# Patient Record
Sex: MALE | Race: WHITE | Employment: UNEMPLOYED | URBAN - METROPOLITAN AREA
[De-identification: names, ages, dates, MRNs, and addresses within clinical notes are randomized per-mention and may not be internally consistent; named-entity substitution may affect disease eponyms.]

---

## 2017-01-12 RX ORDER — HYDROCHLOROTHIAZIDE 25 MG/1
TABLET ORAL
Qty: 90 TABLET | Refills: 0 | Status: SHIPPED | OUTPATIENT
Start: 2017-01-12 | End: 2017-04-09 | Stop reason: SDUPTHER

## 2017-01-12 RX ORDER — FLUOXETINE HYDROCHLORIDE 20 MG/1
CAPSULE ORAL
Qty: 90 CAPSULE | Refills: 0 | Status: SHIPPED | OUTPATIENT
Start: 2017-01-12 | End: 2017-04-09 | Stop reason: SDUPTHER

## 2017-04-10 RX ORDER — HYDROCHLOROTHIAZIDE 25 MG/1
TABLET ORAL
Qty: 90 TABLET | Refills: 0 | Status: SHIPPED | OUTPATIENT
Start: 2017-04-10 | End: 2018-04-24 | Stop reason: SDUPTHER

## 2017-04-10 RX ORDER — FLUOXETINE HYDROCHLORIDE 20 MG/1
CAPSULE ORAL
Qty: 90 CAPSULE | Refills: 0 | Status: SHIPPED | OUTPATIENT
Start: 2017-04-10 | End: 2018-04-24 | Stop reason: SDUPTHER

## 2018-04-24 ENCOUNTER — OFFICE VISIT (OUTPATIENT)
Dept: PRIMARY CARE CLINIC | Age: 53
End: 2018-04-24

## 2018-04-24 VITALS
HEIGHT: 65 IN | WEIGHT: 185 LBS | DIASTOLIC BLOOD PRESSURE: 92 MMHG | BODY MASS INDEX: 30.82 KG/M2 | SYSTOLIC BLOOD PRESSURE: 150 MMHG

## 2018-04-24 DIAGNOSIS — Z12.11 COLON CANCER SCREENING: ICD-10-CM

## 2018-04-24 DIAGNOSIS — I10 BENIGN ESSENTIAL HTN: ICD-10-CM

## 2018-04-24 DIAGNOSIS — Z00.00 ROUTINE PHYSICAL EXAMINATION: Primary | ICD-10-CM

## 2018-04-24 LAB
A/G RATIO: 1.4 (ref 1.1–2.2)
ALBUMIN SERPL-MCNC: 4.2 G/DL (ref 3.4–5)
ALP BLD-CCNC: 81 U/L (ref 40–129)
ALT SERPL-CCNC: 13 U/L (ref 10–40)
ANION GAP SERPL CALCULATED.3IONS-SCNC: 11 MMOL/L (ref 3–16)
AST SERPL-CCNC: 13 U/L (ref 15–37)
BILIRUB SERPL-MCNC: 0.7 MG/DL (ref 0–1)
BUN BLDV-MCNC: 12 MG/DL (ref 7–20)
CALCIUM SERPL-MCNC: 9 MG/DL (ref 8.3–10.6)
CHLORIDE BLD-SCNC: 104 MMOL/L (ref 99–110)
CHOLESTEROL, TOTAL: 160 MG/DL (ref 0–199)
CO2: 27 MMOL/L (ref 21–32)
CREAT SERPL-MCNC: 1.2 MG/DL (ref 0.9–1.3)
CREATININE URINE: 151.4 MG/DL (ref 39–259)
GFR AFRICAN AMERICAN: >60
GFR NON-AFRICAN AMERICAN: >60
GLOBULIN: 2.9 G/DL
GLUCOSE BLD-MCNC: 101 MG/DL (ref 70–99)
HDLC SERPL-MCNC: 40 MG/DL (ref 40–60)
LDL CHOLESTEROL CALCULATED: 102 MG/DL
MICROALBUMIN UR-MCNC: <1.2 MG/DL
MICROALBUMIN/CREAT UR-RTO: NORMAL MG/G (ref 0–30)
POTASSIUM SERPL-SCNC: 4.1 MMOL/L (ref 3.5–5.1)
SODIUM BLD-SCNC: 142 MMOL/L (ref 136–145)
TOTAL PROTEIN: 7.1 G/DL (ref 6.4–8.2)
TRIGL SERPL-MCNC: 88 MG/DL (ref 0–150)
VLDLC SERPL CALC-MCNC: 18 MG/DL

## 2018-04-24 PROCEDURE — 99396 PREV VISIT EST AGE 40-64: CPT | Performed by: INTERNAL MEDICINE

## 2018-04-24 RX ORDER — FLUOXETINE HYDROCHLORIDE 20 MG/1
CAPSULE ORAL
Qty: 30 CAPSULE | Refills: 11 | Status: SHIPPED | OUTPATIENT
Start: 2018-04-24 | End: 2018-07-26 | Stop reason: SDUPTHER

## 2018-04-24 RX ORDER — HYDROCHLOROTHIAZIDE 25 MG/1
TABLET ORAL
Qty: 30 TABLET | Refills: 1 | Status: SHIPPED | OUTPATIENT
Start: 2018-04-24 | End: 2018-06-24 | Stop reason: SDUPTHER

## 2018-04-24 RX ORDER — OMEPRAZOLE 40 MG/1
40 CAPSULE, DELAYED RELEASE ORAL DAILY
Qty: 30 CAPSULE | Refills: 11 | Status: SHIPPED | OUTPATIENT
Start: 2018-04-24 | End: 2018-07-26 | Stop reason: SDUPTHER

## 2018-04-25 PROBLEM — R73.03 PREDIABETES: Status: ACTIVE | Noted: 2018-04-25

## 2018-04-25 LAB
ESTIMATED AVERAGE GLUCOSE: 122.6 MG/DL
HBA1C MFR BLD: 5.9 %
PROSTATE SPECIFIC ANTIGEN: 1.88 NG/ML (ref 0–4)

## 2018-05-07 ENCOUNTER — TELEPHONE (OUTPATIENT)
Dept: PRIMARY CARE CLINIC | Age: 53
End: 2018-05-07

## 2018-06-25 RX ORDER — HYDROCHLOROTHIAZIDE 25 MG/1
TABLET ORAL
Qty: 30 TABLET | Refills: 5 | Status: SHIPPED | OUTPATIENT
Start: 2018-06-25 | End: 2018-07-26 | Stop reason: SDUPTHER

## 2018-07-26 RX ORDER — FLUOXETINE HYDROCHLORIDE 20 MG/1
CAPSULE ORAL
Qty: 30 CAPSULE | Refills: 11 | Status: SHIPPED | OUTPATIENT
Start: 2018-07-26 | End: 2021-01-28 | Stop reason: SDUPTHER

## 2018-07-26 RX ORDER — FLUTICASONE PROPIONATE 50 MCG
SPRAY, SUSPENSION (ML) NASAL
Qty: 1 BOTTLE | Refills: 5 | Status: SHIPPED | OUTPATIENT
Start: 2018-07-26

## 2018-07-26 RX ORDER — HYDROCHLOROTHIAZIDE 25 MG/1
TABLET ORAL
Qty: 30 TABLET | Refills: 5 | Status: SHIPPED | OUTPATIENT
Start: 2018-07-26 | End: 2021-01-28 | Stop reason: SDUPTHER

## 2018-07-26 RX ORDER — OMEPRAZOLE 40 MG/1
40 CAPSULE, DELAYED RELEASE ORAL DAILY
Qty: 30 CAPSULE | Refills: 11 | Status: SHIPPED | OUTPATIENT
Start: 2018-07-26 | End: 2021-02-02 | Stop reason: SDUPTHER

## 2020-12-24 ENCOUNTER — APPOINTMENT (OUTPATIENT)
Dept: CT IMAGING | Age: 55
DRG: 123 | End: 2020-12-24

## 2020-12-24 ENCOUNTER — HOSPITAL ENCOUNTER (INPATIENT)
Age: 55
LOS: 1 days | Discharge: HOME | DRG: 123 | End: 2020-12-25
Attending: EMERGENCY MEDICINE | Admitting: HOSPITALIST

## 2020-12-24 PROBLEM — I16.0 HYPERTENSIVE URGENCY, MALIGNANT: Status: ACTIVE | Noted: 2020-12-24

## 2020-12-24 PROBLEM — I16.1 HYPERTENSIVE EMERGENCY: Status: ACTIVE | Noted: 2020-12-24

## 2020-12-24 PROBLEM — H53.132 SUDDEN LOSS OF VISION, LEFT: Status: ACTIVE | Noted: 2020-12-24

## 2020-12-24 PROBLEM — K21.9 GERD (GASTROESOPHAGEAL REFLUX DISEASE): Status: ACTIVE | Noted: 2020-12-24

## 2020-12-24 PROBLEM — H53.132: Status: ACTIVE | Noted: 2020-12-24

## 2020-12-24 LAB
A/G RATIO: 1.2 (ref 1.1–2.2)
ALBUMIN SERPL-MCNC: 4 G/DL (ref 3.4–5)
ALP BLD-CCNC: 74 U/L (ref 40–129)
ALT SERPL-CCNC: 19 U/L (ref 10–40)
ANION GAP SERPL CALCULATED.3IONS-SCNC: 8 MMOL/L (ref 3–16)
AST SERPL-CCNC: 18 U/L (ref 15–37)
BASOPHILS ABSOLUTE: 0 K/UL (ref 0–0.2)
BASOPHILS RELATIVE PERCENT: 0.6 %
BILIRUB SERPL-MCNC: 0.9 MG/DL (ref 0–1)
BUN BLDV-MCNC: 15 MG/DL (ref 7–20)
CALCIUM SERPL-MCNC: 9 MG/DL (ref 8.3–10.6)
CHLORIDE BLD-SCNC: 105 MMOL/L (ref 99–110)
CO2: 25 MMOL/L (ref 21–32)
CREAT SERPL-MCNC: 1.4 MG/DL (ref 0.9–1.3)
EKG ATRIAL RATE: 60 BPM
EKG DIAGNOSIS: NORMAL
EKG P AXIS: 47 DEGREES
EKG P-R INTERVAL: 154 MS
EKG Q-T INTERVAL: 442 MS
EKG QRS DURATION: 130 MS
EKG QTC CALCULATION (BAZETT): 442 MS
EKG R AXIS: 12 DEGREES
EKG T AXIS: 14 DEGREES
EKG VENTRICULAR RATE: 60 BPM
EOSINOPHILS ABSOLUTE: 0.2 K/UL (ref 0–0.6)
EOSINOPHILS RELATIVE PERCENT: 3.2 %
ETHANOL: NORMAL MG/DL (ref 0–0.08)
GFR AFRICAN AMERICAN: >60
GFR NON-AFRICAN AMERICAN: 53
GLOBULIN: 3.4 G/DL
GLUCOSE BLD-MCNC: 106 MG/DL (ref 70–99)
HCT VFR BLD CALC: 40.9 % (ref 40.5–52.5)
HEMOGLOBIN: 13.4 G/DL (ref 13.5–17.5)
INR BLD: 1.03 (ref 0.86–1.14)
LYMPHOCYTES ABSOLUTE: 1 K/UL (ref 1–5.1)
LYMPHOCYTES RELATIVE PERCENT: 15.1 %
MCH RBC QN AUTO: 26 PG (ref 26–34)
MCHC RBC AUTO-ENTMCNC: 32.9 G/DL (ref 31–36)
MCV RBC AUTO: 79 FL (ref 80–100)
MONOCYTES ABSOLUTE: 0.6 K/UL (ref 0–1.3)
MONOCYTES RELATIVE PERCENT: 9 %
NEUTROPHILS ABSOLUTE: 4.6 K/UL (ref 1.7–7.7)
NEUTROPHILS RELATIVE PERCENT: 72.1 %
PDW BLD-RTO: 14.5 % (ref 12.4–15.4)
PLATELET # BLD: 242 K/UL (ref 135–450)
PMV BLD AUTO: 7.6 FL (ref 5–10.5)
POTASSIUM REFLEX MAGNESIUM: 4 MMOL/L (ref 3.5–5.1)
PRO-BNP: 51 PG/ML (ref 0–124)
PROTHROMBIN TIME: 11.9 SEC (ref 10–13.2)
RBC # BLD: 5.18 M/UL (ref 4.2–5.9)
SODIUM BLD-SCNC: 138 MMOL/L (ref 136–145)
TOTAL PROTEIN: 7.4 G/DL (ref 6.4–8.2)
TROPONIN: <0.01 NG/ML
WBC # BLD: 6.4 K/UL (ref 4–11)

## 2020-12-24 PROCEDURE — 80053 COMPREHEN METABOLIC PANEL: CPT

## 2020-12-24 PROCEDURE — 1200000000 HC SEMI PRIVATE

## 2020-12-24 PROCEDURE — 2500000003 HC RX 250 WO HCPCS: Performed by: EMERGENCY MEDICINE

## 2020-12-24 PROCEDURE — 70496 CT ANGIOGRAPHY HEAD: CPT

## 2020-12-24 PROCEDURE — 85610 PROTHROMBIN TIME: CPT

## 2020-12-24 PROCEDURE — 96374 THER/PROPH/DIAG INJ IV PUSH: CPT

## 2020-12-24 PROCEDURE — 93005 ELECTROCARDIOGRAM TRACING: CPT | Performed by: EMERGENCY MEDICINE

## 2020-12-24 PROCEDURE — 93010 ELECTROCARDIOGRAM REPORT: CPT | Performed by: INTERNAL MEDICINE

## 2020-12-24 PROCEDURE — 2580000003 HC RX 258: Performed by: HOSPITALIST

## 2020-12-24 PROCEDURE — 6370000000 HC RX 637 (ALT 250 FOR IP): Performed by: EMERGENCY MEDICINE

## 2020-12-24 PROCEDURE — G0480 DRUG TEST DEF 1-7 CLASSES: HCPCS

## 2020-12-24 PROCEDURE — 6370000000 HC RX 637 (ALT 250 FOR IP): Performed by: HOSPITALIST

## 2020-12-24 PROCEDURE — 84484 ASSAY OF TROPONIN QUANT: CPT

## 2020-12-24 PROCEDURE — 83880 ASSAY OF NATRIURETIC PEPTIDE: CPT

## 2020-12-24 PROCEDURE — 70450 CT HEAD/BRAIN W/O DYE: CPT

## 2020-12-24 PROCEDURE — 36415 COLL VENOUS BLD VENIPUNCTURE: CPT

## 2020-12-24 PROCEDURE — 85025 COMPLETE CBC W/AUTO DIFF WBC: CPT

## 2020-12-24 PROCEDURE — 6360000004 HC RX CONTRAST MEDICATION: Performed by: EMERGENCY MEDICINE

## 2020-12-24 PROCEDURE — 99285 EMERGENCY DEPT VISIT HI MDM: CPT

## 2020-12-24 RX ORDER — ONDANSETRON 2 MG/ML
4 INJECTION INTRAMUSCULAR; INTRAVENOUS EVERY 6 HOURS PRN
Status: DISCONTINUED | OUTPATIENT
Start: 2020-12-24 | End: 2020-12-25 | Stop reason: HOSPADM

## 2020-12-24 RX ORDER — SODIUM CHLORIDE 0.9 % (FLUSH) 0.9 %
10 SYRINGE (ML) INJECTION PRN
Status: DISCONTINUED | OUTPATIENT
Start: 2020-12-24 | End: 2020-12-25 | Stop reason: HOSPADM

## 2020-12-24 RX ORDER — PANTOPRAZOLE SODIUM 40 MG/1
40 TABLET, DELAYED RELEASE ORAL
Status: DISCONTINUED | OUTPATIENT
Start: 2020-12-25 | End: 2020-12-25 | Stop reason: HOSPADM

## 2020-12-24 RX ORDER — ASPIRIN 81 MG/1
81 TABLET ORAL DAILY
Status: DISCONTINUED | OUTPATIENT
Start: 2020-12-25 | End: 2020-12-25 | Stop reason: HOSPADM

## 2020-12-24 RX ORDER — SODIUM CHLORIDE 0.9 % (FLUSH) 0.9 %
10 SYRINGE (ML) INJECTION EVERY 12 HOURS SCHEDULED
Status: DISCONTINUED | OUTPATIENT
Start: 2020-12-24 | End: 2020-12-25 | Stop reason: HOSPADM

## 2020-12-24 RX ORDER — ASPIRIN 300 MG/1
300 SUPPOSITORY RECTAL DAILY
Status: DISCONTINUED | OUTPATIENT
Start: 2020-12-25 | End: 2020-12-25 | Stop reason: HOSPADM

## 2020-12-24 RX ORDER — SENNA PLUS 8.6 MG/1
1 TABLET ORAL DAILY PRN
Status: DISCONTINUED | OUTPATIENT
Start: 2020-12-24 | End: 2020-12-25 | Stop reason: HOSPADM

## 2020-12-24 RX ORDER — ASPIRIN 81 MG/1
324 TABLET, CHEWABLE ORAL ONCE
Status: COMPLETED | OUTPATIENT
Start: 2020-12-24 | End: 2020-12-24

## 2020-12-24 RX ORDER — ATORVASTATIN CALCIUM 80 MG/1
80 TABLET, FILM COATED ORAL NIGHTLY
Status: DISCONTINUED | OUTPATIENT
Start: 2020-12-24 | End: 2020-12-25 | Stop reason: HOSPADM

## 2020-12-24 RX ORDER — HYDRALAZINE HYDROCHLORIDE 25 MG/1
50 TABLET, FILM COATED ORAL EVERY 8 HOURS SCHEDULED
Status: DISCONTINUED | OUTPATIENT
Start: 2020-12-24 | End: 2020-12-24

## 2020-12-24 RX ORDER — HYDRALAZINE HYDROCHLORIDE 25 MG/1
50 TABLET, FILM COATED ORAL EVERY 8 HOURS SCHEDULED
Status: DISCONTINUED | OUTPATIENT
Start: 2020-12-24 | End: 2020-12-25 | Stop reason: HOSPADM

## 2020-12-24 RX ORDER — FLUOXETINE HYDROCHLORIDE 20 MG/1
20 CAPSULE ORAL DAILY
Status: DISCONTINUED | OUTPATIENT
Start: 2020-12-24 | End: 2020-12-25 | Stop reason: HOSPADM

## 2020-12-24 RX ORDER — ONDANSETRON 4 MG/1
4 TABLET, ORALLY DISINTEGRATING ORAL EVERY 8 HOURS PRN
Status: DISCONTINUED | OUTPATIENT
Start: 2020-12-24 | End: 2020-12-25 | Stop reason: HOSPADM

## 2020-12-24 RX ORDER — LABETALOL HYDROCHLORIDE 5 MG/ML
10 INJECTION, SOLUTION INTRAVENOUS EVERY 10 MIN PRN
Status: DISCONTINUED | OUTPATIENT
Start: 2020-12-24 | End: 2020-12-25 | Stop reason: HOSPADM

## 2020-12-24 RX ADMIN — FLUOXETINE 20 MG: 20 CAPSULE ORAL at 21:48

## 2020-12-24 RX ADMIN — ASPIRIN 324 MG: 81 TABLET, CHEWABLE ORAL at 11:45

## 2020-12-24 RX ADMIN — Medication 10 ML: at 21:50

## 2020-12-24 RX ADMIN — HYDRALAZINE HYDROCHLORIDE 50 MG: 25 TABLET, FILM COATED ORAL at 16:02

## 2020-12-24 RX ADMIN — ATORVASTATIN CALCIUM 80 MG: 80 TABLET, FILM COATED ORAL at 21:48

## 2020-12-24 RX ADMIN — IOPAMIDOL 75 ML: 755 INJECTION, SOLUTION INTRAVENOUS at 09:59

## 2020-12-24 RX ADMIN — HYDRALAZINE HYDROCHLORIDE 50 MG: 25 TABLET, FILM COATED ORAL at 21:48

## 2020-12-24 RX ADMIN — NICARDIPINE HYDROCHLORIDE 3 MG/HR: 0.1 INJECTION, SOLUTION INTRAVENOUS at 11:51

## 2020-12-24 ASSESSMENT — PAIN - FUNCTIONAL ASSESSMENT: PAIN_FUNCTIONAL_ASSESSMENT: 0-10

## 2020-12-24 NOTE — H&P
HOSPITALISTS HISTORY AND PHYSICAL    12/24/2020 12:36 PM    Patient Information:  Claudette Hdz is a 48 y.o. male [id number]  PCP:  No primary care provider on file. (Tel: None )    Chief complaint:    Chief Complaint   Patient presents with    Eye Problem     0830 complete loss of vision in left eye. Now able to see bright colors. No numbness, tingling, weakness, balance issues. History of Present Illness:  Dallas Sy is a 48 y.o. male who presented to the ED to be evaluated for sudden loss of vision in his left eye beginning at 8:30 AM this morning. He denies injury, headache, photophobia, globe discomfort, and eye drainage prior to initiation of symptoms. Patient has no other neurologic symptoms including dysarthria, diplopia, dizziness, or hemiplegia. Upon arrival to the ED vital signs were obtained and notable for profound BP elevation of 209/123. The patient endorses a history of hypertension for which she had been prescribed HydroDIURIL but admits that he has not seen a PCP or taking any BP medications for greater than 1 year. Stat head CT was obtained and notable for bifrontal atrophy as well as small vessel ischemic disease greater than expected for his age. CTA revealed 70% stenosis of left PCA/40% stenosis of right PCA/50% stenosis of ICA. He will require admission to further treat and evaluate hypertensive emergency associated with transient loss of vision. History obtained from patient and review of [facility] chart  Old medical records show patient has a past history of HTN, HLD, GERD, and prediabetes      REVIEW OF SYSTEMS:   Constitutional: Negative for fever,chills or night sweats  Eyes: Positive loss of vision left eye; no pain in globe; no discharge  ENT: Negative for rhinorrhea, epistaxis, hoarseness, sore throat.   Respiratory: Negative for shortness of breath,wheezing  Cardiovascular: Negative for chest pain, palpitations   Gastrointestinal: Negative for nausea, vomiting, diarrhea  Genitourinary: Negative for polyuria, dysuria   Hematologic/Lymphatic: Negative for bleeding tendency, easy bruising  Musculoskeletal: Negative for myalgias and arthralgias  Neurologic: Negative for confusion,dysarthria; negative headache  Skin: Negative for itching,rash  Psychiatric: Positive for depression,anxiety  Endocrine: Negative for polydipsia,polyuria,heat /cold intolerance. Past Medical History:   has a past medical history of Allergic rhinitis, Heartburn, Hypertension, and Prediabetes. Past Surgical History:   has no past surgical history on file. Medications:  No current facility-administered medications on file prior to encounter. Current Outpatient Medications on File Prior to Encounter   Medication Sig Dispense Refill    FLUoxetine (PROZAC) 20 MG capsule TAKE ONE CAPSULE BY MOUTH DAILY 30 capsule 11    fluticasone (FLONASE) 50 MCG/ACT nasal spray PLACE TWO SPRAYS IN EACH NOSTRIL ONCE DAILY 1 Bottle 5    hydrochlorothiazide (HYDRODIURIL) 25 MG tablet TAKE ONE TABLET BY MOUTH EVERY MORNING FOR ELEVATED BLOOD PRESSURE 30 tablet 5    omeprazole (PRILOSEC) 40 MG delayed release capsule Take 1 capsule by mouth daily 30 capsule 11       Allergies:  No Known Allergies     Social History:  Patient Lives  reports that he has never smoked. He has never used smokeless tobacco. He reports that he does not drink alcohol or use drugs. Family History:  family history includes Hypertension in his father. Physical Exam:  BP (!) 147/97   Pulse 57   Temp 98 °F (36.7 °C) (Oral)   Resp 16   Ht 5' 6\" (1.676 m)   Wt 206 lb 4.8 oz (93.6 kg)   SpO2 97%   BMI 33.30 kg/m²     General appearance:  Appears comfortable. Well nourished  Eyes: PERRLA; EOMI bilaterally; no conjunctival injection, no ptosis  ENT: Moist mucus membranes, no thrush.  Trachea Rate 60 BPM QTc Calculation (Bazett) 442 ms   Atrial Rate 60 BPM P Axis 47 degrees   P-R Interval 154 ms R Axis 12 degrees   QRS Duration 130 ms T Axis 14 degrees   Q-T Interval 442 ms Diagnosis Normal sinus rhythmRight bundle branch blockAbnormal ECGConfirmed by Mac Saab MD, Ashleigh Hughes ([id number]) . .. Discussed case  with ED provider    Problem List  Principal Problem:    Vision, loss, sudden, left  Active Problems:    Hypertensive urgency, malignant    Sleep apnea    GERD (gastroesophageal reflux disease)    Sudden loss of vision, left  Resolved Problems:    * No resolved hospital problems. *        Assessment/Plan:     Transient vision loss left eye  -Symptoms resolved  -Neurology consultation placed; question need for ophthalmology with dilated eye exam  -Patient admitted under CVA pathway with every 4 hours neuro checks  -Initiate statin therapy  -Initiate ASA 81 mg daily    Malignant hypertensive urgency  -Suspect responsible for transient visual loss  -Patient currently on Cardene GTT which was initiated in ED  -Begin oral hydralazine 50 mg every 8 H with plans to wean off Cardene  -Echo scheduled for a.m.  -Telemetry monitoring during stay  -Consultation placed to cardiology    GERD-PPI ordered    History of prediabetes-A1c pending; patient euglycemic upon admission      DVT prophylaxis-BLE SCD; no chemical anticoagulation due to concerns for ICH due to malignant HTN  Code status-full code  Diet-cardiac CHARY  IV access-PIV established in ED      Admit as inpatient. I anticipate hospitalization spanning more than two midnights for investigation and treatment of the above medically necessary diagnoses. Please note that some part of this chart was generated using Dragon dictation software. Although every effort was made to ensure the accuracy of this automated transcription, some errors in transcription may have occurred inadvertently.  If you may need any clarification, please do not hesitate to contact me through EPIC.        Yovany Casas MD    12/24/2020 12:36 PM

## 2020-12-24 NOTE — ED NOTES
ED Dr. Emani Cronin assess & ordered  stroke team @ 0940  CT notified of ED CODE STROKE 0940  PAGED \"ED CODE STROKE\" overhead  Lab notified of ED CODE Donna Mcleod stroke Dr  returned call [id number]  12/24/20 21

## 2020-12-24 NOTE — ED NOTES
Pt reports vision has returned to \"Almost normal, just a little cloudy\".      Zamzam Hess RN  12/24/20 1024

## 2020-12-24 NOTE — Clinical Note
Patient Class: Inpatient [101]   REQUIRED: Diagnosis: Sudden loss of vision, left [354756]   Estimated Length of Stay: Estimated stay of more than 2 midnights   Admitting Provider: Keli Guzman [[id number]]   Preferred Department: Currently on Cardene GTT with titration   Telemetry Bed Required?: Yes

## 2020-12-25 ENCOUNTER — HOSPITAL ENCOUNTER (OUTPATIENT)
Age: 55
Setting detail: OBSERVATION
Discharge: HOME | End: 2020-12-26
Attending: EMERGENCY MEDICINE | Admitting: INTERNAL MEDICINE

## 2020-12-25 VITALS
SYSTOLIC BLOOD PRESSURE: 128 MMHG | DIASTOLIC BLOOD PRESSURE: 83 MMHG | WEIGHT: 206.3 LBS | OXYGEN SATURATION: 97 % | BODY MASS INDEX: 33.16 KG/M2 | HEART RATE: 86 BPM | HEIGHT: 66 IN | RESPIRATION RATE: 16 BRPM | TEMPERATURE: 97.9 F

## 2020-12-25 PROBLEM — H53.122: Status: ACTIVE | Noted: 2020-12-25

## 2020-12-25 PROBLEM — H54.7 VISION LOSS: Status: ACTIVE | Noted: 2020-12-25

## 2020-12-25 LAB
CHOLESTEROL, TOTAL: 152 MG/DL (ref 0–199)
HCT VFR BLD CALC: 43.1 % (ref 40.5–52.5)
HDLC SERPL-MCNC: 35 MG/DL (ref 40–60)
HEMOGLOBIN: 14.5 G/DL (ref 13.5–17.5)
LDL CHOLESTEROL CALCULATED: 102 MG/DL
MCH RBC QN AUTO: 26.1 PG (ref 26–34)
MCHC RBC AUTO-ENTMCNC: 33.6 G/DL (ref 31–36)
MCV RBC AUTO: 77.8 FL (ref 80–100)
PDW BLD-RTO: 14.8 % (ref 12.4–15.4)
PLATELET # BLD: 310 K/UL (ref 135–450)
PMV BLD AUTO: 8.2 FL (ref 5–10.5)
RBC # BLD: 5.54 M/UL (ref 4.2–5.9)
TRIGL SERPL-MCNC: 73 MG/DL (ref 0–150)
VLDLC SERPL CALC-MCNC: 15 MG/DL
WBC # BLD: 10.6 K/UL (ref 4–11)

## 2020-12-25 PROCEDURE — 2580000003 HC RX 258: Performed by: HOSPITALIST

## 2020-12-25 PROCEDURE — 80061 LIPID PANEL: CPT

## 2020-12-25 PROCEDURE — 36415 COLL VENOUS BLD VENIPUNCTURE: CPT

## 2020-12-25 PROCEDURE — 97530 THERAPEUTIC ACTIVITIES: CPT

## 2020-12-25 PROCEDURE — 2580000003 HC RX 258: Performed by: INTERNAL MEDICINE

## 2020-12-25 PROCEDURE — G0378 HOSPITAL OBSERVATION PER HR: HCPCS

## 2020-12-25 PROCEDURE — 97535 SELF CARE MNGMENT TRAINING: CPT

## 2020-12-25 PROCEDURE — 85027 COMPLETE CBC AUTOMATED: CPT

## 2020-12-25 PROCEDURE — 92526 ORAL FUNCTION THERAPY: CPT

## 2020-12-25 PROCEDURE — 83036 HEMOGLOBIN GLYCOSYLATED A1C: CPT

## 2020-12-25 PROCEDURE — 6370000000 HC RX 637 (ALT 250 FOR IP): Performed by: INTERNAL MEDICINE

## 2020-12-25 PROCEDURE — 97165 OT EVAL LOW COMPLEX 30 MIN: CPT

## 2020-12-25 PROCEDURE — 99284 EMERGENCY DEPT VISIT MOD MDM: CPT

## 2020-12-25 PROCEDURE — 99254 IP/OBS CNSLTJ NEW/EST MOD 60: CPT | Performed by: INTERNAL MEDICINE

## 2020-12-25 PROCEDURE — 6370000000 HC RX 637 (ALT 250 FOR IP): Performed by: HOSPITALIST

## 2020-12-25 PROCEDURE — 97161 PT EVAL LOW COMPLEX 20 MIN: CPT

## 2020-12-25 PROCEDURE — 92610 EVALUATE SWALLOWING FUNCTION: CPT

## 2020-12-25 RX ORDER — FLUTICASONE PROPIONATE 50 MCG
1 SPRAY, SUSPENSION (ML) NASAL DAILY
Status: DISCONTINUED | OUTPATIENT
Start: 2020-12-26 | End: 2020-12-26 | Stop reason: HOSPADM

## 2020-12-25 RX ORDER — ACETAMINOPHEN 650 MG/1
650 SUPPOSITORY RECTAL EVERY 6 HOURS PRN
Status: DISCONTINUED | OUTPATIENT
Start: 2020-12-25 | End: 2020-12-26 | Stop reason: HOSPADM

## 2020-12-25 RX ORDER — PANTOPRAZOLE SODIUM 40 MG/1
40 TABLET, DELAYED RELEASE ORAL
Status: DISCONTINUED | OUTPATIENT
Start: 2020-12-26 | End: 2020-12-26 | Stop reason: HOSPADM

## 2020-12-25 RX ORDER — AMLODIPINE BESYLATE 5 MG/1
10 TABLET ORAL DAILY
Status: DISCONTINUED | OUTPATIENT
Start: 2020-12-26 | End: 2020-12-26 | Stop reason: HOSPADM

## 2020-12-25 RX ORDER — ONDANSETRON 2 MG/ML
4 INJECTION INTRAMUSCULAR; INTRAVENOUS EVERY 6 HOURS PRN
Status: DISCONTINUED | OUTPATIENT
Start: 2020-12-25 | End: 2020-12-26 | Stop reason: HOSPADM

## 2020-12-25 RX ORDER — PROMETHAZINE HYDROCHLORIDE 25 MG/1
12.5 TABLET ORAL EVERY 6 HOURS PRN
Status: DISCONTINUED | OUTPATIENT
Start: 2020-12-25 | End: 2020-12-26 | Stop reason: HOSPADM

## 2020-12-25 RX ORDER — AMLODIPINE BESYLATE 5 MG/1
10 TABLET ORAL DAILY
Qty: 60 TABLET | Refills: 0 | Status: SHIPPED | OUTPATIENT
Start: 2020-12-25 | End: 2021-01-28 | Stop reason: SDUPTHER

## 2020-12-25 RX ORDER — AMLODIPINE BESYLATE 5 MG/1
5 TABLET ORAL DAILY
Status: DISCONTINUED | OUTPATIENT
Start: 2020-12-25 | End: 2020-12-25 | Stop reason: HOSPADM

## 2020-12-25 RX ORDER — ATORVASTATIN CALCIUM 40 MG/1
40 TABLET, FILM COATED ORAL NIGHTLY
Status: DISCONTINUED | OUTPATIENT
Start: 2020-12-25 | End: 2020-12-26 | Stop reason: HOSPADM

## 2020-12-25 RX ORDER — ASPIRIN 81 MG/1
81 TABLET ORAL DAILY
Status: DISCONTINUED | OUTPATIENT
Start: 2020-12-26 | End: 2020-12-26 | Stop reason: HOSPADM

## 2020-12-25 RX ORDER — FLUOXETINE HYDROCHLORIDE 20 MG/1
20 CAPSULE ORAL DAILY
Status: DISCONTINUED | OUTPATIENT
Start: 2020-12-26 | End: 2020-12-26 | Stop reason: HOSPADM

## 2020-12-25 RX ORDER — ACETAMINOPHEN 325 MG/1
650 TABLET ORAL EVERY 6 HOURS PRN
Status: DISCONTINUED | OUTPATIENT
Start: 2020-12-25 | End: 2020-12-26 | Stop reason: HOSPADM

## 2020-12-25 RX ORDER — SODIUM CHLORIDE 0.9 % (FLUSH) 0.9 %
10 SYRINGE (ML) INJECTION PRN
Status: DISCONTINUED | OUTPATIENT
Start: 2020-12-25 | End: 2020-12-26 | Stop reason: HOSPADM

## 2020-12-25 RX ORDER — ASPIRIN 81 MG/1
81 TABLET ORAL DAILY
Qty: 30 TABLET | Refills: 0 | Status: SHIPPED | OUTPATIENT
Start: 2020-12-26 | End: 2022-10-11

## 2020-12-25 RX ORDER — SODIUM CHLORIDE 0.9 % (FLUSH) 0.9 %
10 SYRINGE (ML) INJECTION EVERY 12 HOURS SCHEDULED
Status: DISCONTINUED | OUTPATIENT
Start: 2020-12-25 | End: 2020-12-26 | Stop reason: HOSPADM

## 2020-12-25 RX ORDER — ATORVASTATIN CALCIUM 40 MG/1
40 TABLET, FILM COATED ORAL NIGHTLY
Qty: 30 TABLET | Refills: 0 | Status: SHIPPED | OUTPATIENT
Start: 2020-12-25 | End: 2021-01-28 | Stop reason: SDUPTHER

## 2020-12-25 RX ORDER — HYDROCHLOROTHIAZIDE 25 MG/1
25 TABLET ORAL DAILY
Status: DISCONTINUED | OUTPATIENT
Start: 2020-12-26 | End: 2020-12-26 | Stop reason: HOSPADM

## 2020-12-25 RX ORDER — POLYETHYLENE GLYCOL 3350 17 G/17G
17 POWDER, FOR SOLUTION ORAL DAILY PRN
Status: DISCONTINUED | OUTPATIENT
Start: 2020-12-25 | End: 2020-12-26 | Stop reason: HOSPADM

## 2020-12-25 RX ADMIN — Medication 10 ML: at 22:35

## 2020-12-25 RX ADMIN — FLUOXETINE 20 MG: 20 CAPSULE ORAL at 08:11

## 2020-12-25 RX ADMIN — ATORVASTATIN CALCIUM 40 MG: 40 TABLET, FILM COATED ORAL at 22:27

## 2020-12-25 RX ADMIN — Medication 10 ML: at 08:11

## 2020-12-25 RX ADMIN — HYDRALAZINE HYDROCHLORIDE 50 MG: 25 TABLET, FILM COATED ORAL at 05:34

## 2020-12-25 RX ADMIN — AMLODIPINE BESYLATE 5 MG: 5 TABLET ORAL at 12:11

## 2020-12-25 RX ADMIN — ASPIRIN 81 MG: 81 TABLET, COATED ORAL at 08:10

## 2020-12-25 RX ADMIN — PANTOPRAZOLE SODIUM 40 MG: 40 TABLET, DELAYED RELEASE ORAL at 05:34

## 2020-12-25 ASSESSMENT — PAIN SCALES - GENERAL: PAINLEVEL_OUTOF10: 0

## 2020-12-25 NOTE — ED PROVIDER NOTES
Emergency Physician Note    Chief Complaint  Eye Problem (0830 complete loss of vision in left eye. Now able to see bright colors. No numbness, tingling, weakness, balance issues.)       History of Present Illness  Krista Alvarez is a 48 y.o. male who presents to the ED for vision loss. Patient reports vision loss in the left eye. He states that around 830 this morning when he woke up he noticed that it was completely dark with only a little bit of peripheral vision far lateral that was present in the left eye. He states that when he went to the bathroom in the middle the night he did not turn on the lights and is not sure if it was present at that time as well. He states it is improved somewhat since initial onset and now feels like there is a web or release in front of his thigh but is still diminished. He denies any other symptoms. He states he has hypertension but has not been treated for a few years because he does not have insurance. He denies any other acute complaints. No numbness or weakness. No trouble with his speech or balance. 10 systems reviewed, pertinent positives per HPI otherwise noted to be negative    I have reviewed the following from the nursing documentation:      Prior to Admission medications    Medication Sig Start Date End Date Taking?  Authorizing Provider   aspirin 81 MG EC tablet Take 1 tablet by mouth daily 12/26/20 1/25/21 Yes Berta Grey MD   atorvastatin (LIPITOR) 40 MG tablet Take 1 tablet by mouth nightly 12/25/20 1/24/21 Yes Berta Grey MD   amLODIPine (NORVASC) 5 MG tablet Take 2 tablets by mouth daily 12/25/20 1/24/21 Yes Berta Grey MD   FLUoxetine (PROZAC) 20 MG capsule TAKE ONE CAPSULE BY MOUTH DAILY 7/26/18   Reyes Pal Abbott, MD   fluticasone Marda Gasman) 50 MCG/ACT nasal spray PLACE TWO SPRAYS IN EACH NOSTRIL ONCE DAILY 7/26/18   Tammy Sam MD   hydrochlorothiazide (HYDRODIURIL) 25 MG tablet TAKE ONE TABLET BY MOUTH EVERY MORNING Not on file   Social History Narrative    Wife Radha        Manages Birgit       Nursing notes reviewed. ED Triage Vitals   Enc Vitals Group      BP 12/24/20 0935 (!) 209/123      Pulse 12/24/20 0935 65      Resp 12/24/20 0935 20      Temp 12/24/20 0935 98 °F (36.7 °C)      Temp Source 12/24/20 0935 Oral      SpO2 12/24/20 0935 98 %      Weight 12/24/20 0943 206 lb 4.8 oz (93.6 kg)      Height 12/24/20 0943 5' 6\" (1.676 m)      Head Circumference --       Peak Flow --       Pain Score --       Pain Loc --       Pain Edu? --       Excl. in GC? --        GENERAL:  Awake, alert. Well developed, well nourished with no apparent distress. HENT:  Normocephalic, Atraumatic, moist mucous membranes. EYES:  Pupils equal round and reactive to light, Conjunctiva normal, extraocular movements normal.  NECK:  No meningeal signs, Supple. CHEST:  Regular rate and rhythm, chest wall non-tender. LUNGS:  Clear to auscultation bilaterally. ABDOMEN:  Soft, non-tender, no rebound, rigidity or guarding, non-distended, normal bowel sounds. No costovertebral angle tenderness to palpation. BACK:  No tenderness. EXTREMITIES:  Normal range of motion, no edema, no bony tenderness, no deformity, distal pulses present. SKIN: Warm, dry and intact. NEUROLOGIC: Alphonso Raisin Runner's NIH Stroke Scale is:  Level of Consciousness:  0 - alert; keenly responsive  LOC Questions:  0 - answers both questions correctly  LOC Commands:  0 - performs both tasks correctly  Best Gaze:  0 - normal  Visual Fields:  1 -no hemianopia, left eye vision loss only.   Facial Palsy:  0 - normal symmetric movement  Motor-Arm-Left:  0 - no drift, limb holds 90 (or 45) degrees for full 10 seconds  Motor-Leg-Left:  0 - no drift; leg holds 30 degree position for full 5 seconds  Motor-Arm-Right:  0 - no drift, limb holds 90 (or 45) degrees for full 10 seconds  Motor-Leg-Right:  0 - no drift; leg holds 30 degree position for full 5 seconds  Limb Ataxia:  0 - absent  Sensory:  0 - normal; no sensory loss  Best Language:  0 - no aphasia, normal  Dysarthria:  0 - normal  Extinction and Inattention:  0 - no abnormality        LABS and DIAGNOSTIC RESULTS  EKG  The Ekg interpreted by me shows  normal sinus rhythm with a rate of 60  Axis is   Normal  QTc is  normal  RBBB  ST Segments: normal  No significant change from prior EKG dated 6/24/14    RADIOLOGY  X-RAYS:  I have reviewed radiologic plain film image(s). ALL OTHER NON-PLAIN FILM IMAGES SUCH AS CT, ULTRASOUND AND MRI HAVE BEEN READ BY THE RADIOLOGIST. CTA HEAD NECK W CONTRAST   Final Result   70% stenosis at the origin of the P2 segment of the left PCA. 40% stenosis in the P1 segment of right PCA. Up to 50% stenosis in the cavernous/supraclinoid segments of the bilateral   internal carotid arteries. No acute abnormality or flow-limiting stenosis of the major arteries of the   neck. Mild prominence of the right hilar lymph node, likely reactive. CT HEAD WO CONTRAST   Final Result   Atrophy, greatest in the bifrontal regions, greater than expected for patient   age. Mild small vessel ischemic disease. If there remains strong clinical   concern for acute on chronic ischemia, consider MR. Findings were discussed with Douglas Robertson at 10:00 on 12/24/2020.            LABS  Labs Reviewed   CBC WITH AUTO DIFFERENTIAL - Abnormal; Notable for the following components:       Result Value    Hemoglobin 13.4 (*)     MCV 79.0 (*)     All other components within normal limits    Narrative:     Performed at:  [address], [address] [place] [address]   Phone [phone]   COMPREHENSIVE METABOLIC PANEL W/ REFLEX TO MG FOR LOW K - Abnormal; Notable for the following components:    Glucose 106 (*)     CREATININE 1.4 (*)     GFR Non- 53 (*)     All other components within normal limits    Narrative:     Performed at:  [id number] [employer] [address] [facility], [facility] [employer]   Phone [phone]                        TROPONIN    Narrative:     Performed at:  [facility]) - [address], [facility] [employer]   Phone [phone]   PROTIME-INR    Narrative:     Performed at:  [facility]) - [place] [address] [employer]   Phone [phone]   ETHANOL    Narrative:     Performed at:  [facility]) - [address], [facility] [employer]   Phone [phone] time is 45 minutes which excludes separately billable procedures. The critical care was concerning evaluation for possible acute stroke as well as treatment for hypertensive urgency. This time is exclusive of any time documented by any other providers. Given that the patient's symptoms were improving and that they were only in one eye I do not believe that this is an acute stroke. I spoke with stroke team and they agreed that this is not a good candidate for TPA. They did not however agree with admission and further evaluation. Patient's hypertension improved significantly with medication given in the ER and stabilized. I spoke with Dr. Gio Alvarez. We thoroughly discussed the history, physical exam, laboratory and imaging studies, as well as, emergency department course. Based upon that discussion, we've decided to admit Noel Moreno for further observation and evaluation of Bronson Brackett Runner's CVA-like symptoms. As I have deemed necessary from their history, physical and studies, I have considered and evaluated Noel Moreno for the following diagnoses:  DIABETES, INTRACRANIAL HEMORRHAGE, MENINGITIS, SUBARACHNOID HEMORRHAGE, SUBDURAL HEMATOMA, & STROKE. FINAL IMPRESSION  1. Vision loss of left eye    2.  Hypertensive urgency        Vitals:  Blood pressure 128/83, pulse 86, temperature 97.9 °F (36.6 °C), temperature source Oral, resp. rate 16, height 5' 6\" (1.676 m), weight 206 lb 4.8 oz (93.6 kg), SpO2 97 %. Disposition  Pt is in stable condition upon Admit to telemetry. This chart was generated using the [employer] dictation system. I created this record but it may contain dictation errors.           Stefany Camarena MD  12/25/20 3418

## 2020-12-25 NOTE — PROGRESS NOTES
Physical Therapy    Facility/Department: [place] [address] - MED SURG/ORTHO  Initial Assessment/ Discharge    NAME: Tayo Cosme  : 1965  MRN: [id number]    Date of Service: 2020    Discharge Recommendations:  Home with assist PRN   PT Equipment Recommendations  Equipment Needed: No    Assessment   Assessment: 49 yo male on OBS for sudden vision loss L eye, bilat carotid stenosis. PLOF: lives with wife , indep amb and ADLs. Today pt able to demonstrate baseline independence with bed mob, transfers, gait , stair negotiation. Pt demonstrates balance WFL, strength WFL, sensation WFL. No PT needs. Pt voices understnading of signs/symptoms of stroke and need to seek medical attn should they occur. Recommend home assist prn  Prognosis: Excellent  Decision Making: Low Complexity  PT Education: Goals; Disease Specific Education;PT Role;Functional Mobility Training;General Safety;Plan of Care  Patient Education: pt educated in signs/symptoms of stroke and need to seek immediate medical attn should they occur  Barriers to Learning: none  REQUIRES PT FOLLOW UP: No  Activity Tolerance  Activity Tolerance: Patient Tolerated treatment well       Patient Diagnosis(es): The primary encounter diagnosis was Vision loss of left eye. A diagnosis of Hypertensive urgency was also pertinent to this visit. has a past medical history of Allergic rhinitis, Heartburn, Hypertension, and Prediabetes. has no past surgical history on file.     Restrictions  Restrictions/Precautions  Restrictions/Precautions: General Precautions, Up as Tolerated  Position Activity Restriction  Other position/activity restrictions: low fall risk  Vision/Hearing  Vision: Impaired(wears glasses or contacts , L eye vision loss resolved)  Hearing: Within functional limits     Subjective  General  Chart Reviewed: Yes  Patient assessed for rehabilitation services?: Yes  Family / Caregiver Present: No  Referring Practitioner: Evette Horne  Referral Date : 12/24/20  Diagnosis: sudden loss of vision L eye. Upon testing pt found to have stenosis Bilat carotid arteries  Follows Commands: Within Functional Limits  General Comment  Comments: pt resting in bed on approach  Subjective  Subjective: Agrees to therapy, voices understanding of all instructions  Pain Screening  Patient Currently in Pain: Denies  Vital Signs  Patient Currently in Pain: Denies       Orientation  Orientation  Overall Orientation Status: Within Normal Limits  Social/Functional History  Social/Functional History  Lives With: Spouse  Type of Home: House  Home Layout: Two level  Home Access: Stairs to enter without rails  ADL Assistance: Independent  Homemaking Assistance: Independent  Ambulation Assistance: Independent  Transfer Assistance: Independent  Objective     RLE AROM: WFL  LLE AROM : WFL  Strength RLE: WFL  Strength LLE: L hip ext and abd 4+, knee ext 5, DF 4- (pt states prior sciatic problem may have resulted in weakness LLE)      Sensation  Overall Sensation Status: WFL  Bed mobility  Rolling to Left: Independent  Rolling to Right: Independent  Supine to Sit: Independent  Sit to Supine: Independent  Transfers  Sit to Stand: Independent  Stand to sit: Independent  Ambulation  Surface: level tile  Device: No Device  Assistance: Independent  Quality of Gait: WFL  Distance: 200 ft  Stairs/Curb  Stairs?: Yes(pt was able to negotiate 3 six inch steps withouep)     Balance  Sitting - Static: Good  Sitting - Dynamic: Good  Standing - Static: Good  Standing - Dynamic: Good  Exercises  Straight Leg Raise: 5 x B  Heelslides: 5 x B  Hip Extension/Leg Presses: Sit to and from stand 5 x in succession  Hip Abduction: 5 x B sidelying  Ankle Pumps: 15 x B     Plan   Times per week: 1 x only  Current Treatment Recommendations: Safety Education & Training  Safety Devices  Type of devices:  All fall risk precautions in place, Call light within reach, Left in bed, Nurse notified  AM-PAC Score 24/24

## 2020-12-25 NOTE — PLAN OF CARE
Problem: Nutrition  Intervention: Swallowing evaluation  Note: SLP completed evaluation. Please refer to notes in EMR. Intervention: Aspiration precautions  Note: SLP completed evaluation. Please refer to notes in EMR.     Lisa Rene M.A., [address] [facility] #[id number]  Speech-Language Pathologist

## 2020-12-25 NOTE — PROGRESS NOTES
ophthalmic, maxillary, and mandibular facial sensation WNL  CN VII: WNL b/l  CN IX/X: MPT:26 ; pitch range:Adequate; vocal quality:WNL perceptually; cough:strong, perceptually  CN XII: WNL b/l  Not consistent with upper or lower motor neuron deficits    Laryngeal function exam:   Secretions: Adequate. Dry, pink oral mucosa  Vocal quality: WNL  MPT: 26  S/Z ratio: 1.0  Pitch range: Adequate, perceptually  Cough: Strong, perceptually    PO trials:   Ice: No clinical s/s of aspiration  IDDSI 0: No clinical s/s of aspiration 5/5. Timely swallow, no anterior bolus loss, no coughing or choking, dry vocal quality  IDDSI 2: assessment not needed  IDDSI 3: assessment not needed  IDDSI 4: No clinical s/s of aspiration, +swallow initiation, adequate AP transit, no oral stasis post swallow, timely swallow. IDDSI 5: assessment not needed  IDDSI 6: assessment not needed  IDDSI 7: No clinical s/s of aspiration, functional mastication and rotary mandibular movement,  +swallow initiation, adequate AP transit, no oral stasis post swallow, timely swallow. 3 oz water: PASS    No clinical signs of oral/pharyngeal/esophageal dysphagia. Swallow prognosis is excellent. Instrumental swallow study is not indicated. Given stable respiratory status and lack of s/s of aspiration during completion of clinical bedside assessment pt is safe for oral diet at this time. No further SLP intervention is warranted. Please re-consult if additional problems/concerns arise. Instrumentation: No clinically indicated  Diet recommendation: Regular solids and thin liquids. Meds whole with water or PO as tolerated/preferred. Risk management: Upright for all oral intake, increase physical mobility as able, hold PO and contact SLP if s/s of aspiration develop.     Treatment Plan  Requires SLP Intervention: No     D/C Recommendations: Home independently  Referral To: Neurology    Recommended Diet and Intervention  Diet Solids Recommendation: Regular  Liquid Consistency Recommendation: Thin  Recommended Form of Meds: Whole with water     Therapeutic Interventions: Patient/Family education    Compensatory Swallowing Strategies  Compensatory Swallowing Strategies: Upright as possible for all oral intake;Remain upright for 30-45 minutes after meals    Treatment/Goals: n/a     General  Chart Reviewed: Yes  Comments: Chart reviewed for completion of assessment  Subjective  Subjective: The patient is seen in room at bedside with RN permission. Alert, pleasant, and cooperative. Behavior/Cognition: Pleasant mood; Cooperative; Alert  Respiratory Status: Room air  O2 Device: None (Room air)  Communication Observation: Functional  Follows Directions: Complex  Dentition: Adequate  Patient Positioning: Upright in bed  Baseline Vocal Quality: Normal  Volitional Cough: Strong  Prior Dysphagia History: No prior dysphagia hx per chart. Consistencies Administered: Reg solid; Dysphagia Pureed (Dysphagia I); Thin - cup         Vision/Hearing  Vision  Vision: Impaired  Vision Exceptions: Wears glasses at all times  Hearing  Hearing: Within functional limits    Oral Motor Deficits  Oral/Motor  Oral Motor: Within functional limits    Oral Phase Dysfunction  Oral Phase  Oral Phase: WNL     Indicators of Pharyngeal Phase Dysfunction   Pharyngeal Phase  Pharyngeal Phase: WNL    Prognosis  Prognosis  Prognosis for safe diet advancement: excellent  Individuals consulted  Consulted and agree with results and recommendations: Patient;RN    Education  Patient Education: SLP re: Role of SLP, rationale for completion of assessment, results of assessment, recommendations  Patient Education Response: Verbalizes understanding;Demonstrated understanding  Safety Devices in place: Yes  Type of devices: All fall risk precautions in place; Left in bed;Bed alarm in place;Call light within reach;Nurse notified       Therapy Time  SLP Individual Minutes  Time In: 1883  Time Out: [phone]  Minutes: 24        Kelly MCQUEEN Declan Slater, [address]  12/25/2020 1:51 PM  [facility] [place]., [address] [facility] #[id number]  Speech-Language Pathologist

## 2020-12-25 NOTE — PROGRESS NOTES
Occupational Therapy   Occupational Therapy Initial Assessment/Discharge   Date: 2020   Patient Name: Alma Ibanez  MRN: [id number]     : 1965    Date of Service: 2020    Discharge Recommendations:  Home with assist PRN  OT Equipment Recommendations  Equipment Needed: No    Assessment   Prognosis: Good  Decision Making: Low Complexity  OT Education: OT Role;Plan of Care  No Skilled OT: Independent with functional mobility; Independent with ADL's;At baseline function; Safe to return home  REQUIRES OT FOLLOW UP: No  Activity Tolerance  Activity Tolerance: Patient Tolerated treatment well  Safety Devices  Safety Devices in place: Yes  Type of devices: Call light within reach; Left in bed           Patient Diagnosis(es): The primary encounter diagnosis was Vision loss of left eye. A diagnosis of Hypertensive urgency was also pertinent to this visit. has a past medical history of Allergic rhinitis, Heartburn, Hypertension, and Prediabetes. has no past surgical history on file.            Restrictions  Restrictions/Precautions  Restrictions/Precautions: General Precautions, Up as Tolerated  Position Activity Restriction  Other position/activity restrictions: low fall risk    Subjective   General  Chart Reviewed: Yes, Imaging, History and Physical, Progress Notes  Patient assessed for rehabilitation services?: Yes  Family / Caregiver Present: No  Referring Practitioner: Aric Morales MD  Diagnosis: L side vision loss  Subjective  Subjective: Pt pleasant and agreeable to OT  Patient Currently in Pain: Denies  Vital Signs  Temp: 97.8 °F (36.6 °C)  Pulse: 67  Resp: 16  BP: 112/69  Level of Consciousness: Alert (0)  MEWS Score: 1  Patient Currently in Pain: Denies  Oxygen Therapy  SpO2: 97 %  Social/Functional History  Social/Functional History  Lives With: Spouse  Type of Home: House  Home Layout: Two level  Home Access: Stairs to enter without rails  ADL Assistance: Independent  Homemaking Assistance: Independent  Ambulation Assistance: Independent  Transfer Assistance: Independent       Objective   Hearing: Within functional limits    Orientation  Overall Orientation Status: Within Functional Limits  Observation/Palpation  Posture: Good  Balance  Sitting Balance: Independent  Standing Balance: Independent  Functional Mobility  Functional - Mobility Device: No device  Activity: To/From therapy gym; Other  Assist Level: Independent  Toilet Transfers  Toilet - Technique: Ambulating  Equipment Used: Standard toilet  ADL  Feeding: Independent  Grooming: Independent(oral care at sink)  Tone RUE  RUE Tone: Normotonic  Tone LUE  LUE Tone: Normotonic  Coordination  Movements Are Fluid And Coordinated: Yes     Bed mobility  Rolling to Left: Independent  Rolling to Right: Independent  Supine to Sit: Independent  Sit to Supine: Independent  Scooting: Independent  Transfers  Sit to stand: Independent  Stand to sit: Independent  Vision - Basic Assessment  Prior Vision: Wears glasses for distance only  Patient Visual Report: No visual complaint reported. Oculo Motor Control:  WNL  Vision Comments: pt reports vision has improved since recent admission/need to come to hospital  Cognition  Overall Cognitive Status: WFL        Sensation  Overall Sensation Status: [address]  Times per week: eval and d/c    AM-PAC Score        AM-[facility] Inpatient Daily Activity Raw Score: 24 (12/25/20 0946)  AM-PAC Inpatient ADL T-Scale Score : 57.54 (12/25/20 0946)  ADL Inpatient CMS 0-100% Score: 0 (12/25/20 0946)  ADL Inpatient CMS G-Code Modifier : [facility] (12/25/20 8723)    Goals  Short term goals  Time Frame for Short term goals: by 12/25/20  Short term goal 1: Pt will complete oral care at sink with independence- GOAL MET       Therapy Time   Individual Concurrent Group Co-treatment   Time In 0816         Time Out 0839         Minutes 23         Timed Code Treatment Minutes: [address] [facility] Emily, OTR/L

## 2020-12-25 NOTE — ED PROVIDER NOTES
Emergency Department Provider Note  Location: [place] [address] - MED SURG  12/25/2020     Patient Identification  Noel Moreno is a 48 y.o. male    Chief Complaint  Other (pt was here yesterday with left eye vision loss was diagnosis with TIA, he left AMA, he came back to finish test. denies any return of symptoms today. )          HPI  (History provided by patient)  69-year-old male who returns emergency department for further evaluation for transient episode left-sided complete vision loss. He initially presented yesterday and had been admitted for TIA amaurosis fugax however patient left AGAINST MEDICAL ADVICE prior to work-up completion including MRI. Patient reports that he went home and spoke with his wife who \"talked some sense into me\". Patient reports now somewhat tearful stating that he made a mistake by leaving the hospital and he now wishes to complete the work-up and he knows it that is the right thing to do\". He denies any further symptoms since he left the hospital.  He is currently asymptomatic without any focal complaints. I have reviewed the following nursing documentation:  Allergies: No Known Allergies    Past medical history:  has a past medical history of Allergic rhinitis, Heartburn, Hypertension, and Prediabetes (4/25/2018). Past surgical history:  has no past surgical history on file. Home medications:   Prior to Admission medications    Medication Sig Start Date End Date Taking?  Authorizing Provider   aspirin 81 MG EC tablet Take 1 tablet by mouth daily 12/26/20 1/25/21 Yes Kristen Tubbs MD   atorvastatin (LIPITOR) 40 MG tablet Take 1 tablet by mouth nightly 12/25/20 1/24/21 Yes Kristen Tubbs MD   amLODIPine (NORVASC) 5 MG tablet Take 2 tablets by mouth daily 12/25/20 1/24/21 Yes Kristen Tubbs MD   FLUoxetine (PROZAC) 20 MG capsule TAKE ONE CAPSULE BY MOUTH DAILY 7/26/18  Yes Renan Christian MD fluticasone (FLONASE) 50 MCG/ACT nasal spray PLACE TWO SPRAYS IN EACH NOSTRIL ONCE DAILY 7/26/18  Yes Kemal Carbajal MD   hydrochlorothiazide (HYDRODIURIL) 25 MG tablet TAKE ONE TABLET BY MOUTH EVERY MORNING FOR ELEVATED BLOOD PRESSURE 7/26/18  Yes Kemal Carbajal MD   omeprazole (PRILOSEC) 40 MG delayed release capsule Take 1 capsule by mouth daily 7/26/18  Yes Kemal Carbajal MD       Social history:  reports that he has never smoked. He has never used smokeless tobacco. He reports that he does not drink alcohol or use drugs. Family history:    Family History   Problem Relation Age of Onset    Hypertension Father     Asthma Neg Hx     Cancer Neg Hx     Diabetes Neg Hx     Emphysema Neg Hx     Heart Failure Neg Hx          ROS  Review of Systems   Constitutional: Negative for chills and fever. HENT: Negative for congestion and rhinorrhea. Eyes: Positive for visual disturbance. Negative for photophobia. Respiratory: Negative for cough, shortness of breath and wheezing. Cardiovascular: Negative for chest pain and palpitations. Gastrointestinal: Negative for abdominal pain, diarrhea, nausea and vomiting. Genitourinary: Negative for dysuria and hematuria. Musculoskeletal: Negative for back pain and neck pain. Skin: Negative for rash and wound. Neurological: Negative for syncope and weakness. Psychiatric/Behavioral: Negative for agitation and confusion. Exam  ED Triage Vitals [12/25/20 5204]   BP Temp Temp src Pulse Resp SpO2 Height Weight   (!) 154/103 98.5 °F (36.9 °C) -- 81 16 99 % -- --       Physical Exam  Vitals signs and nursing note reviewed. Constitutional:       General: He is not in acute distress. Appearance: He is well-developed. HENT:      Head: Normocephalic and atraumatic. Nose: Nose normal. No congestion. Eyes:      Extraocular Movements: Extraocular movements intact. Pupils: Pupils are equal, round, and reactive to light. Neck:      Musculoskeletal: Normal range of motion and neck supple. Cardiovascular:      Rate and Rhythm: Normal rate and regular rhythm. Heart sounds: No murmur. Pulmonary:      Effort: Pulmonary effort is normal.      Breath sounds: Normal breath sounds. Abdominal:      General: There is no distension. Palpations: Abdomen is soft. Tenderness: There is no abdominal tenderness. Musculoskeletal: Normal range of motion. General: No deformity. Skin:     General: Skin is warm. Findings: No rash. Neurological:      Mental Status: He is alert and oriented to person, place, and time. Motor: No abnormal muscle tone. Coordination: Coordination normal.      Comments: NIH stroke scale 0, no cranial nerve deficits appreciated, strength 5 out of 5 all extremities, sensation is intact, no central ataxia, no cerebellar signs present, no deviation of vertical skew. Psychiatric:         Mood and Affect: Mood normal.         Behavior: Behavior normal.           ED Course    ED Medication Orders (From admission, onward)    None              Radiology  No results found. Labs  Results for orders placed or performed during the hospital encounter of 12/25/20   Sedimentation Rate   Result Value Ref Range    Sed Rate 33 (H) 0 - 20 mm/Hr   C-Reactive Protein   Result Value Ref Range    CRP 7.7 (H) 0.0 - 5.1 mg/L         [facility]  Patient seen and evaluated. Relevant records reviewed. 32-year-old male returns emergency department after leaving [place] requesting to complete his hospitalization work-up for suspected TIA for transient episode of left-sided vision loss. He is currently asymptomatic. No symptoms since he left the hospital.  In the ED he is well-appearing no acute distress no focal deficits. Patient states he is agreeable to staying throughout the completion of his work-up in hospital.  Discussed with hospitalist who agrees to accept for further care. Clinical Impression:  1. TIA (transient ischemic attack)          Disposition:  Admit to telemetry in stable condition. Blood pressure 128/81, pulse 65, temperature 98 °F (36.7 °C), temperature source Oral, resp. rate 15, height 5' 6\" (1.676 m), weight 202 lb 11.2 oz (91.9 kg), SpO2 97 %. Patient was given scripts for the following medications. I counseled patient how to take these medications. Discharge Medication List as of 12/26/2020  4:21 PM          Disposition referral (if applicable):  Janay Johnson MD  [address]  [phone]    In 1 month      Romulo Friedman MD  [address] [place] [address]  [phone]    Schedule an appointment as soon as possible for a visit in 1 week      [facility]  [address] [facility], [address] [place] [address]  Schedule an appointment as soon as possible for a visit  follow up        Total critical care time is 0 minutes, which excludes separately billable procedures and updating family. Time spent is specifically for management of the presenting complaint and symptoms initially, direct bedside care, reevaluation, review of records, and consultation. There was a high probability of clinically significant life-threatening deterioration in the patient's condition, which required my urgent intervention.

## 2020-12-25 NOTE — CONSULTS
[address] [facility]  [phone]      Attending Physician: Berta Grey MD  Reason for Consultation/Chief Complaint: Vision loss    Subjective   History of Present Illness:  Krista Alvarez is a 48 y.o. patient who presented to the hospital with complaints of vision loss in left eye, he presented to the emergency room yesterday and symptoms improved within a few hours. He was admitted to the hospital due to concerns of TIA/stroke, initial CT scans were negative for acute abnormality, MRI is pending. Today, patient has no complaints with his vision, he denies chest pain, palpitations, dizziness or loss of consciousness, he denies shortness of breath or swelling. He denies any prior cardiac history or evaluation. He says he does have chronically hypertension and does not able to take his blood pressure medications for a year due to loss of insurance. When he presented to the hospital, blood pressure was noted to be elevated. Troponin levels were checked and found to be negative x3. Past Medical History:   has a past medical history of Allergic rhinitis, Heartburn, Hypertension, and Prediabetes. Social History:   reports that he has never smoked. He has never used smokeless tobacco. He reports that he does not drink alcohol or use drugs. Family History:  family history includes Hypertension in his father. Home Medications:  Were reviewed and are listed in nursing record and/or below  Prior to Admission medications    Medication Sig Start Date End Date Taking?  Authorizing Provider   aspirin 81 MG EC tablet Take 1 tablet by mouth daily 12/26/20 1/25/21 Yes Berta Grey MD   atorvastatin (LIPITOR) 40 MG tablet Take 1 tablet by mouth nightly 12/25/20 1/24/21 Yes Berta Grey MD   amLODIPine (NORVASC) 5 MG tablet Take 2 tablets by mouth daily 12/25/20 1/24/21 Yes Berta Grey MD   FLUoxetine (PROZAC) 20 MG capsule TAKE ONE CAPSULE BY MOUTH DAILY 7/26/18   [facility] Adrian Lopez MD   fluticasone (FLONASE) 50 MCG/ACT nasal spray PLACE TWO SPRAYS IN EACH NOSTRIL ONCE DAILY 7/26/18   Shade Bronson MD   hydrochlorothiazide (HYDRODIURIL) 25 MG tablet TAKE ONE TABLET BY MOUTH EVERY MORNING FOR ELEVATED BLOOD PRESSURE 7/26/18   Shade Bronson MD   omeprazole (PRILOSEC) 40 MG delayed release capsule Take 1 capsule by mouth daily 7/26/18   Shade Bronson MD        CURRENT Medications:      amLODIPine (NORVASC) tablet 5 mg, Daily      FLUoxetine (PROZAC) capsule 20 mg, Daily      pantoprazole (PROTONIX) tablet 40 mg, QAM AC      sodium chloride flush 0.9 % injection 10 mL, 2 times per day      sodium chloride flush 0.9 % injection 10 mL, PRN      aspirin EC tablet 81 mg, Daily    Or      aspirin suppository 300 mg, Daily      perflutren lipid microspheres (DEFINITY) injection 1.65 mg, ONCE PRN      senna (SENOKOT) tablet 8.6 mg, Daily PRN      ondansetron (ZOFRAN-ODT) disintegrating tablet 4 mg, Q8H PRN    Or      ondansetron (ZOFRAN) injection 4 mg, Q6H PRN      atorvastatin (LIPITOR) tablet 80 mg, Nightly      labetalol (NORMODYNE;TRANDATE) injection 10 mg, Q10 Min PRN      hydrALAZINE (APRESOLINE) tablet 50 mg, 3 times per day        Allergies:  Patient has no known allergies. Review of Systems:   A 14 point review of symptoms completed. Pertinent positives identified in the HPI, all other review of symptoms negative as below.       Objective   PHYSICAL EXAM:    Vitals:    12/25/20 1428   BP: 128/83   Pulse: 86   Resp: 16   Temp: 97.9 °F (36.6 °C)   SpO2: 97%    Weight: 206 lb 4.8 oz (93.6 kg)         General Appearance:  Alert, cooperative, no distress, appears stated age   Head:  Normocephalic, without obvious abnormality, atraumatic   Eyes:  PERRL, conjunctiva/corneas clear   Nose: Nares normal, no drainage or sinus tenderness   Throat: Lips, mucosa, and tongue normal   Neck: Supple, symmetrical, trachea midline, no adenopathy, thyroid: not enlarged, symmetric, no tenderness/mass/nodules, no carotid bruit or JVD   Lungs:   Clear to auscultation bilaterally, respirations unlabored   Chest Wall:  No deformity or tenderness   Heart:  Regular rate and rhythm, S1, S2 normal, no murmur, rub or gallop   Abdomen:   Soft, non-tender, bowel sounds active all four quadrants,  no masses, no organomegaly   Extremities: Extremities normal, atraumatic, no cyanosis or edema   Pulses: 2+ and symmetric   Skin: Skin color, texture, turgor normal, no rashes or lesions   Pysch: Normal mood and affect   Neurologic: Normal gross motor and sensory exam.         Labs   CBC:   Lab Results   Component Value Date    WBC 10.6 12/25/2020    RBC 5.54 12/25/2020    HGB 14.5 12/25/2020    HCT 43.1 12/25/2020    MCV 77.8 12/25/2020    RDW 14.8 12/25/2020     12/25/2020     CMP:  Lab Results   Component Value Date     12/24/2020    K 4.0 12/24/2020     12/24/2020    CO2 25 12/24/2020    BUN 15 12/24/2020    CREATININE 1.4 12/24/2020    GFRAA >60 12/24/2020    AGRATIO 1.2 12/24/2020    LABGLOM 53 12/24/2020    GLUCOSE 106 12/24/2020    PROT 7.4 12/24/2020    CALCIUM 9.0 12/24/2020    BILITOT 0.9 12/24/2020    ALKPHOS 74 12/24/2020    AST 18 12/24/2020    ALT 19 12/24/2020     PT/INR:  No results found for: PTINR  HgBA1c:  Lab Results   Component Value Date    LABA1C 5.9 04/24/2018     Lab Results   Component Value Date    TROPONINI <0.01 12/24/2020         Cardiac Data     Last EKG:   Normal sinus rhythm, right bundle branch block, overall similar to EKG from 2014    Echo:    Stress Test:    Cath:    Studies:       Ct head       Impression   Atrophy, greatest in the bifrontal regions, greater than expected for patient   age. Dewane Codding small vessel ischemic disease.  If there remains strong clinical   concern for acute on chronic ischemia, consider MR. I have reviewed labs and imaging/xray/diagnostic testing in this note.     Assessment and Plan          Patient

## 2020-12-25 NOTE — PROGRESS NOTES
WBC 6.4   HGB 13.4*   HCT 40.9        Recent Labs     12/24/20  0959      K 4.0      CO2 25   BUN 15   CREATININE 1.4*   CALCIUM 9.0     Recent Labs     12/24/20  0959   AST 18   ALT 19   BILITOT 0.9   ALKPHOS 74     Recent Labs     12/24/20  0959   INR 1.03     Recent Labs     12/24/20  0959 12/24/20  1927 12/24/20  2306   TROPONINI <0.01 <0.01 <0.01       Urinalysis:    No results found for: Stephen Alvarado, WBCUA, BACTERIA, RBCUA, BLOODU, SPECGRAV, GLUCOSEU    Consults:    IP CONSULT TO STROKE TEAM  IP CONSULT TO PHARMACY  PHARMACY TO CHANGE BASE FLUIDS  IP CONSULT TO HOSPITALIST  IP CONSULT TO SOCIAL WORK  IP CONSULT TO NEUROLOGY  IP CONSULT TO CARDIOLOGY      Assessment/Plan:    Active Hospital Problems    Diagnosis    Hypertensive urgency, 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    -Patient admitted under CVA pathway with every 4 hours neuro checks  -Initiate statin therapy  -Initiate ASA 81 mg daily     Malignant hypertensive urgency  -Suspect responsible for transient visual loss  -Patient weaned off Cardene GTT which was initiated in ED, now on low-dose Norvasc  -Echo ordered.  -Telemetry monitoring during stay  -Consultation placed to cardiology     GERD - w/out active signs/sxs of dysphagia/odynophagia. No evidence of active PUD or hx of GI bleed. Controlled on home PPI - continue.     History of prediabetes-A1c pending; patient euglycemic upon admission     Obesity -  With Body mass index is 33.3 kg/m². Complicating assessment and treatment. Placing patient at risk for multiple co-morbidities as well as early death and contributing to the patient's presentation. Counseled on weight loss.         DVT Prophylaxis: IPC  Diet: DIET CARDIAC; No Added Salt (3-4 GM)  Code Status: Full Code      PT/OT Eval Status: ordered and pending    Dispo - likely to home Friday 25 Dec pending clinical course and subspecialty recs.      Deric Cohen MD

## 2020-12-25 NOTE — PLAN OF CARE
Problem: Infection:  Goal: Will remain free from infection  Description: Will remain free from infection  Outcome: Ongoing     Problem: Safety:  Goal: Free from accidental physical injury  Description: Free from accidental physical injury  Outcome: Ongoing  Goal: Free from intentional harm  Description: Free from intentional harm  Outcome: Ongoing     Problem: Pain:  Goal: Patient's pain/discomfort is manageable  Description: Patient's pain/discomfort is manageable  Outcome: Ongoing

## 2020-12-25 NOTE — CARE COORDINATION
Received inpatient consult to social work regarding no insurance, no PCP , and difficulty affording medications. Met with patient at bedside and provided PCP list and referral to [facility]. Financial Counselor is unavailable today due to holiday. However patient stated he met with someone from financial counseling yesterday and they provided application for assistance. Discharge pending MRI. No other needs pertaining to discharge or barriers to discharge identified at this time.

## 2020-12-26 ENCOUNTER — APPOINTMENT (OUTPATIENT)
Dept: MRI IMAGING | Age: 55
End: 2020-12-26

## 2020-12-26 VITALS
DIASTOLIC BLOOD PRESSURE: 81 MMHG | WEIGHT: 202.7 LBS | SYSTOLIC BLOOD PRESSURE: 128 MMHG | HEART RATE: 65 BPM | RESPIRATION RATE: 15 BRPM | OXYGEN SATURATION: 97 % | HEIGHT: 66 IN | TEMPERATURE: 98 F | BODY MASS INDEX: 32.58 KG/M2

## 2020-12-26 LAB
C-REACTIVE PROTEIN: 7.7 MG/L (ref 0–5.1)
ESTIMATED AVERAGE GLUCOSE: 108.3 MG/DL
HBA1C MFR BLD: 5.4 %
SEDIMENTATION RATE, ERYTHROCYTE: 33 MM/HR (ref 0–20)

## 2020-12-26 PROCEDURE — 99218 PR INITIAL OBSERVATION CARE/DAY 30 MINUTES: CPT | Performed by: PSYCHIATRY & NEUROLOGY

## 2020-12-26 PROCEDURE — 2580000003 HC RX 258: Performed by: INTERNAL MEDICINE

## 2020-12-26 PROCEDURE — 36415 COLL VENOUS BLD VENIPUNCTURE: CPT

## 2020-12-26 PROCEDURE — 6370000000 HC RX 637 (ALT 250 FOR IP): Performed by: INTERNAL MEDICINE

## 2020-12-26 PROCEDURE — G0378 HOSPITAL OBSERVATION PER HR: HCPCS

## 2020-12-26 PROCEDURE — 70551 MRI BRAIN STEM W/O DYE: CPT

## 2020-12-26 PROCEDURE — 86140 C-REACTIVE PROTEIN: CPT

## 2020-12-26 PROCEDURE — 85652 RBC SED RATE AUTOMATED: CPT

## 2020-12-26 RX ADMIN — FLUTICASONE PROPIONATE 1 SPRAY: 50 SPRAY, METERED NASAL at 09:17

## 2020-12-26 RX ADMIN — HYDROCHLOROTHIAZIDE 25 MG: 25 TABLET ORAL at 09:17

## 2020-12-26 RX ADMIN — ASPIRIN 81 MG: 81 TABLET, COATED ORAL at 09:17

## 2020-12-26 RX ADMIN — AMLODIPINE BESYLATE 10 MG: 5 TABLET ORAL at 09:18

## 2020-12-26 RX ADMIN — FLUOXETINE 20 MG: 20 CAPSULE ORAL at 09:18

## 2020-12-26 RX ADMIN — Medication 10 ML: at 09:18

## 2020-12-26 RX ADMIN — PANTOPRAZOLE SODIUM 40 MG: 40 TABLET, DELAYED RELEASE ORAL at 06:01

## 2020-12-26 ASSESSMENT — PAIN SCALES - GENERAL: PAINLEVEL_OUTOF10: 0

## 2020-12-26 NOTE — ED NOTES
Patient transported to inpatient unit via wheelchair. Patient care transferred to inpatient nurse at this time. Patient stable at time of transfer. Discussed during nurse to nurse report was, including but not limited to: client's purpose of admission and/or transfer, initial and current mental status, vital signs, medication interventions or procedures, abnormal test results, and significant events or changes that occurred during the admission. The receiving nurse was prompted to ask questions and informed that the current department staff could be contacted for additional questions if needed. Report discussed with [facility] RN.      Laci Ambrosio RN  12/25/20 8526

## 2020-12-26 NOTE — PROGRESS NOTES
Elsaad added to treatment team @12:45am on 12/26/2020  RN is aware Electronically signed by [place] Officer on 12/26/2020 at 12:45 AM

## 2020-12-26 NOTE — H&P
Hospital Medicine History & Physical      PCP: No primary care provider on file. Date of Admission: 12/25/2020    Date of Service: Pt seen/examined on 12/25/2020 and  Placed in Observation. Chief Complaint: Signed out AMA earlier today, admitted for left eye transient vision loss      History Of Present Illness:    48 y.o. male who presented to [facility] with above complaints  Patient was admitted on 12/24 for transient vision loss of left eye. He was also found to be in hypertensive urgency, was started on Cardene drip which was initiated in the ED and eventually weaned off and transferred to the floor on 12/25. Patient awaiting MRI brain to rule out stroke. Neurology consult pending. However patient decided to leave AMA earlier today as he was getting anxious and could not stay any longer in the hospital.  Patient returns to the ED tonight reporting that he wants to complete the work-up. He denies any recurrence of symptoms of vision loss. Denies any chest pain. Past Medical History:          Diagnosis Date    Allergic rhinitis     Heartburn     Hypertension     Prediabetes 4/25/2018       Past Surgical History:      History reviewed. No pertinent surgical history. Medications Prior to Admission:      Prior to Admission medications    Medication Sig Start Date End Date Taking?  Authorizing Provider   aspirin 81 MG EC tablet Take 1 tablet by mouth daily 12/26/20 1/25/21 Yes David Granado MD   atorvastatin (LIPITOR) 40 MG tablet Take 1 tablet by mouth nightly 12/25/20 1/24/21 Yes David Granado MD   amLODIPine (NORVASC) 5 MG tablet Take 2 tablets by mouth daily 12/25/20 1/24/21 Yes David Granado MD   FLUoxetine (PROZAC) 20 MG capsule TAKE ONE CAPSULE BY MOUTH DAILY 7/26/18  Yes Andrews Singh MD   fluticasone [facility]) 50 MCG/ACT nasal spray PLACE TWO SPRAYS IN EACH NOSTRIL ONCE DAILY 7/26/18  Yes Andrews Singh MD hydrochlorothiazide (HYDRODIURIL) 25 MG tablet TAKE ONE TABLET BY MOUTH EVERY MORNING FOR ELEVATED BLOOD PRESSURE 7/26/18  Yes Tammy Sam MD   omeprazole (PRILOSEC) 40 MG delayed release capsule Take 1 capsule by mouth daily 7/26/18  Yes Tammy Sam MD       Allergies:  Patient has no known allergies. Social History:      The patient currently lives at home    TOBACCO:   reports that he has never smoked. He has never used smokeless tobacco.  ETOH:   reports no history of alcohol use. E-Cigarettes/Vaping Use     Questions Responses    E-Cigarette/Vaping Use Never User    Start Date     Passive Exposure     Quit Date     Counseling Given     Comments             Family History:    Positive as follows:        Problem Relation Age of Onset    Hypertension Father     Asthma Neg Hx     Cancer Neg Hx     Diabetes Neg Hx     Emphysema Neg Hx     Heart Failure Neg Hx        REVIEW OF SYSTEMS:   Pertinent positives as noted in the HPI. All other systems reviewed and negative. PHYSICAL EXAM PERFORMED:    BP (!) 160/110   Pulse 78   Temp 98.6 °F (37 °C) (Oral)   Resp 16   Ht 5' 6\" (1.676 m)   Wt 202 lb 11.2 oz (91.9 kg)   SpO2 100%   BMI 32.72 kg/m²     General appearance:  No apparent distress, appears stated age and cooperative. HEENT:  Normal cephalic, atraumatic without obvious deformity. Pupils equal, round, and reactive to light. Extra ocular muscles intact. Conjunctivae/corneas clear. Neck: Supple, with full range of motion. No jugular venous distention. Trachea midline. Respiratory:  Normal respiratory effort. Clear to auscultation, bilaterally without Rales/Wheezes/Rhonchi. Cardiovascular:  Regular rate and rhythm with normal S1/S2 without murmurs, rubs or gallops. Abdomen: Soft, non-tender, non-distended with normal bowel sounds. Musculoskeletal:  No clubbing, cyanosis or edema bilaterally. Full range of motion without deformity. Skin: Skin color, texture, turgor normal.  No rashes or lesions. Neurologic:  Neurovascularly intact without any focal sensory/motor deficits.  Cranial nerves: II-XII intact, grossly non-focal.  Psychiatric:  Alert and oriented, thought content appropriate, normal insight  Capillary Refill: Brisk,< 3 seconds   Peripheral Pulses: +2 palpable, equal bilaterally       Labs:     Recent Labs     12/24/20  0959 12/25/20  0851   WBC 6.4 10.6   HGB 13.4* 14.5   HCT 40.9 43.1    310     Recent Labs     12/24/20  0959      K 4.0      CO2 25   BUN 15   CREATININE 1.4*   CALCIUM 9.0     Recent Labs     12/24/20  0959   AST 18   ALT 19   BILITOT 0.9   ALKPHOS 74     Recent Labs     12/24/20  0959   INR 1.03     Recent Labs     12/24/20  0959 12/24/20  1927 12/24/20  2306   TROPONINI <0.01 <0.01 <0.01         MRI BRAIN WO CONTRAST    (Results Pending)       ASSESSMENT:PLAN:    Transient monocular blindness, left   -Likely amaurosis fugax, transient CRAO vs BRAO either from carotid atheroemboli or cardiac thromboemboli.  -Patient left AMA earlier today, returns to complete work-up  -We will re-order MRI brain without contrast, unclear why it was not done earlier today  -Check CRP, ESR to rule out GCA although less likely clinically  -Can get 2D echo, cardiac monitor work-up as outpatient as decided by patient/cardiology  -Risk factor reduction  -Neurology consult  -Continue aspirin, statin  -Continue telemetry monitoring while in the hospital    Hyperlipidemia  LDL C 102, would ideally keep it less than 70  -Continue statin, counseled about dietary modifications  -Recheck lipid profile in 3 to 6 months by PCP    HTN, uncontrolled  Patient weaned off Cardene GTT which was initiated in ED  -Resume amlodipine 10 mg daily, hydrochlorothiazide 25 mg daily and monitor BP  -Cardiology input appreciated earlier today GERD - w/out active signs/sxs of dysphagia/odynophagia. No evidence of active PUD or hx of GI bleed. Controlled on home PPI - continue.     History of prediabetesA1c pending; patient euglycemic upon admission     Obesity -  With Body mass index is 32.7 kg/m². Complicating assessment and treatment. Placing patient at risk for multiple co-morbidities as well as early death and contributing to the patient's presentation. Counseled on weight loss. OCD-resume SSRI    DVT Prophylaxis: Lovenox  Diet: DIET GENERAL;  Code Status: Full Code    PT/OT Eval Status: Ambulatory    Dispo -observation       Sujey Nicole MD    Thank you No primary care provider on file. for the opportunity to be involved in this patient's care. If you have any questions or concerns please feel free to contact me at [phone].

## 2020-12-26 NOTE — ED NOTES
[facility] @ [id number]   Re: TIA, left AMA 1 hour ago, changed mind  Dr. Danilo Lentz @ [id number]  12/25/20 1927

## 2020-12-26 NOTE — PROGRESS NOTES
Discharge orders placed by MD, verified by RN prior to discharge. Discharge paperwork complete, reviewed, and signed with the patient. A copy of all paperwork provided. All questions and concerns resolved at the time of review. Patient verbalized understanding of paperwork. Prescriptions called in to [facility] per the patient's request. RN removed the tele box, informed [facility] of discharge, and removed IV prior to discharge.  Patient discharged to home by self, ambulated independently out of the hospital at his request.

## 2020-12-26 NOTE — CONSULTS
In patient Neurology consult        [facility]      MD Taya Trammell  1965    Date of Service: 12/26/2020    Referring Physician: Radha Joya MD      Reason for the consult and CC: Acute left visual loss. HPI:   The patient is a 48y.o.  years old male without significant past medical history was admitted to the hospital yesterday with acute transient left visual loss. Symptoms started 2 days ago. He describes sudden onset of painless visual loss in his left eye for at least an hour or so followed by blurred vision for another 2 hours. Degree was severe and constant. No triggers or other associated symptoms. No headache, chest pain, dysphagia or dysarthria. He came into the ED where he was admitted for 1 day. He left AMA and came back due to concern for impending stroke. He was admitted again yesterday. Today he denies any new symptoms. MRI of the brain showed no acute findings. His blood pressure on admission was above 212 and he was diagnosed with hypertension and hypertensive urgency. He denies any prior history of strokes or TIA. He does not smoke. No history of DVT or PE. Today feels back to his baseline. No residual deficit. Other review of system was unremarkable. Family History   Problem Relation Age of Onset    Hypertension Father     Asthma Neg Hx     Cancer Neg Hx     Diabetes Neg Hx     Emphysema Neg Hx     Heart Failure Neg Hx      History reviewed. No pertinent surgical history.      Past Medical History:   Diagnosis Date    Allergic rhinitis     Heartburn     Hypertension     Prediabetes 4/25/2018     Social History     Tobacco Use    Smoking status: Never Smoker    Smokeless tobacco: Never Used   Substance Use Topics    Alcohol use: No    Drug use: Never     No Known Allergies  Current Facility-Administered Medications   Medication Dose Route Frequency Provider Last Rate Last Admin  amLODIPine (NORVASC) tablet 10 mg  10 mg Oral Daily David Blackwell MD   10 mg at 12/26/20 6781    aspirin EC tablet 81 mg  81 mg Oral Daily David Blackwell MD   81 mg at 12/26/20 0917    atorvastatin (LIPITOR) tablet 40 mg  40 mg Oral Nightly David Blackwell MD   40 mg at 12/25/20 2227    FLUoxetine (PROZAC) capsule 20 mg  20 mg Oral Daily David Blackwell MD   20 mg at 12/26/20 0918    fluticasone (FLONASE) 50 MCG/ACT nasal spray 1 spray  1 spray Each Nostril Daily David Blackwell MD   1 spray at 12/26/20 0917    hydroCHLOROthiazide (HYDRODIURIL) tablet 25 mg  25 mg Oral Daily David Blackwell MD   25 mg at 12/26/20 8299    pantoprazole (PROTONIX) tablet 40 mg  40 mg Oral QAM AC David Blackwell MD   40 mg at 12/26/20 0601    sodium chloride flush 0.9 % injection 10 mL  10 mL Intravenous 2 times per day David Blackwell MD   10 mL at 12/26/20 9521    sodium chloride flush 0.9 % injection 10 mL  10 mL Intravenous PRN David Blackwell MD        enoxaparin (LOVENOX) injection 40 mg  40 mg Subcutaneous Daily David Blackwell MD        promethazine (PHENERGAN) tablet 12.5 mg  12.5 mg Oral Q6H PRN David Blackwell MD        Or    ondansetron (ZOFRAN) injection 4 mg  4 mg Intravenous Q6H PRN David Blackwell MD        polyethylene glycol (GLYCOLAX) packet 17 g  17 g Oral Daily PRN David Blackwell MD        acetaminophen (TYLENOL) tablet 650 mg  650 mg Oral Q6H PRN David Blackwell MD        Or    acetaminophen (TYLENOL) suppository 650 mg  650 mg Rectal Q6H PRN David Blackwell MD           ROS : A 10-14 system review of constitutional, cardiovascular, respiratory, eyes, musculoskeletal, endocrine, GI, ENT, skin, hematological, genitourinary, psychiatric and neurologic systems was obtained and updated today and is unremarkable except as mentioned in my HPI      Exam:     Constitutional:   Vitals: 12/26/20 0015 12/26/20 0545 12/26/20 0819 12/26/20 1116   BP: (!) 150/96 (!) 139/103 (!) 141/82 128/81   Pulse: 62 82 76 65   Resp: 16 16 14 15   Temp:   98.2 °F (36.8 °C) 98 °F (36.7 °C)   TempSrc:   Oral Oral   SpO2: 98% 98% 98% 97%   Weight:       Height:           General appearance:  Normal development and appear in no acute distress. Eye: No icterus. Fundus: Funduscopic examination cannot be performed due to COVID19 restrictions and precautions. Neck: supple  Cardiovascular: No lower leg edema with good pulsation. Mental Status:   Oriented to person, place, problem, and time. Memory: Aware of recent and remote event. Good immediate recall. Intact remote memory  Normal attention span and concentration. Language: intact naming, repeating and fluency   Good fund of Knowledge. Aware of current events and vocabulary   Cranial Nerves:   II: Visual fields: Full. Pupils: equal, round, reactive to light  III,IV,VI: Extra Ocular Movements are intact. No nystagmus  V: Facial sensation is intact   VII: Facial strength and movements: intact and symmetric  VIII: Hearing: Intact  IX: Palate elevation is symmetric  XI: Shoulder shrug is intact  XII: Tongue movements are normal  Musculoskeletal: 5/5 in all 4 extremities. Tone: Normal tone. Reflexes: 2+ in the upper extremity and 2+ in the lower extremity   Planters: flexor bilaterally. Coordination: no pronator drift, no dysmetria with FNF in upper extremities. Normal REM. Sensation: normal to all modalities in both arms and legs. Gait/Posture: steady gait and normal posturing and station.      Data:  LABS:   Lab Results   Component Value Date     12/24/2020    K 4.0 12/24/2020     12/24/2020    CO2 25 12/24/2020    BUN 15 12/24/2020    CREATININE 1.4 12/24/2020    GFRAA >60 12/24/2020    LABGLOM 53 12/24/2020    GLUCOSE 106 12/24/2020    CALCIUM 9.0 12/24/2020     Lab Results   Component Value Date    WBC 10.6 12/25/2020 RBC 5.54 12/25/2020    HGB 14.5 12/25/2020    HCT 43.1 12/25/2020    MCV 77.8 12/25/2020    RDW 14.8 12/25/2020     12/25/2020     Lab Results   Component Value Date    INR 1.03 12/24/2020    PROTIME 11.9 12/24/2020       Neuroimaging were independently reviewed by myself and discussed results with the patient  Reviewed notes from different physicians  Reviewed lab and blood testing    Impression:  Acute painless left visual loss. Likely TIA secondary to thromboembolic event from poorly controlled hypertension and hypertensive urgency  Hypertension, not diagnosis. New diagnosis  Hyperlipidemia,     Recommendation:  Stroke prevention was discussed in detail with the patient  Aspirin  Statin  Telemetry  Continue current blood pressure medications and advised the patient to monitor blood pressure after discharge  Echo  Telemetry  DVT and GI prophylaxis  Follow-up with his new PCP for further stroke prevention including blood pressure management  Can be discharged when medically stable  No further recommendation  We will sign off. Thank you for referring such patient. If you have any questions regarding my consult note, please don't hesitate to call me. Adryan Fowler MD  [phone]    This dictation was generated by voice recognition computer software.  Although all attempts are made to edit the dictation for accuracy, there may be errors in the  transcription that are not intended

## 2020-12-28 ASSESSMENT — ENCOUNTER SYMPTOMS
COUGH: 0
RHINORRHEA: 0
ABDOMINAL PAIN: 0
BACK PAIN: 0
NAUSEA: 0
WHEEZING: 0
PHOTOPHOBIA: 0
DIARRHEA: 0
VOMITING: 0
SHORTNESS OF BREATH: 0

## 2020-12-28 NOTE — DISCHARGE SUMMARY
Hospital Medicine Discharge Summary    Patient ID: Hiral Loomis      Patient's PCP: No primary care provider on file. Admit Date: 12/25/2020     Discharge Date: 12/26/2020      Admitting Physician: Oscar Soliz MD     Discharge Physician: Samantha Diego MD     Discharge Diagnoses: Active Hospital Problems    Diagnosis    TIA (transient ischemic attack) [G45.9]    Dyslipidemia [E78.5]    Vision loss [H54.7]    Transient monocular blindness, left [H53.122]    Hypertensive urgency [I16.0]    BMI 30.0-30.9,adult [Z68.30]    Benign essential HTN [I10]       The patient was seen and examined on day of discharge and this discharge summary is in conjunction with any daily progress note from day of discharge. Hospital Course:         49 yo male patient was admitted on 12/24 for transient vision loss of left eye. He was also found to be in hypertensive urgency, was started on Cardene drip which was initiated in the ED and eventually weaned off and transferred to the floor on 12/25. Neurology consulted. Likely TIA secondary to thromboembolic event from poorly controlled hypertension and hypertensive urgency. Continue ASA, statin. F/u with PCP, neurology outpatient. Will also require 2D echo outpatient to rule out cardiac etiology. HTN- instructed to continue amlodipine and HCTZ to control BP. HLD- statin. Physical Exam Performed:     /81   Pulse 65   Temp 98 °F (36.7 °C) (Oral)   Resp 15   Ht 5' 6\" (1.676 m)   Wt 202 lb 11.2 oz (91.9 kg)   SpO2 97%   BMI 32.72 kg/m²       General appearance:  No apparent distress, appears stated age and cooperative. HEENT:  Normal cephalic, atraumatic without obvious deformity. Pupils equal, round, and reactive to light. Extra ocular muscles intact. Conjunctivae/corneas clear. Neck: Supple, with full range of motion. No jugular venous distention. Trachea midline. Respiratory:  Normal respiratory effort. Clear to auscultation, bilaterally without Rales/Wheezes/Rhonchi. Cardiovascular:  Regular rate and rhythm with normal S1/S2 without murmurs, rubs or gallops. Abdomen: Soft, non-tender, non-distended with normal bowel sounds. Musculoskeletal:  No clubbing, cyanosis or edema bilaterally. Full range of motion without deformity. Skin: Skin color, texture, turgor normal.  No rashes or lesions. Neurologic:  Neurovascularly intact without any focal sensory/motor deficits. Cranial nerves: II-XII intact, grossly non-focal.  Psychiatric:  Alert and oriented, thought content appropriate, normal insight  Capillary Refill: Brisk,< 3 seconds   Peripheral Pulses: +2 palpable, equal bilaterally       Labs: For convenience and continuity at follow-up the following most recent labs are provided:      CBC:    Lab Results   Component Value Date    WBC 10.6 12/25/2020    HGB 14.5 12/25/2020    HCT 43.1 12/25/2020     12/25/2020       Renal:    Lab Results   Component Value Date     12/24/2020    K 4.0 12/24/2020     12/24/2020    CO2 25 12/24/2020    BUN 15 12/24/2020    CREATININE 1.4 12/24/2020    CALCIUM 9.0 12/24/2020         Significant Diagnostic Studies    Radiology:   MRI BRAIN WO CONTRAST   Final Result   1. No acute intracranial abnormality. No acute infarct. 2. Minimal global parenchymal volume loss with mild likely chronic   microvascular ischemic changes. Consults:     IP CONSULT TO HOSPITALIST  IP CONSULT TO NEUROLOGY    Disposition:  home    Condition at Discharge: Stable    Discharge Instructions/Follow-up: With PCP for 2D echo.      Code Status:  Prior     Activity: activity as tolerated    Diet: regular diet      Discharge Medications:     Discharge Medication List as of 12/26/2020  4:21 PM           Details   aspirin 81 MG EC tablet Take 1 tablet by mouth daily, Disp-30 tablet, R-0Print atorvastatin (LIPITOR) 40 MG tablet Take 1 tablet by mouth nightly, Disp-30 tablet, R-0Print      amLODIPine (NORVASC) 5 MG tablet Take 2 tablets by mouth daily, Disp-60 tablet, R-0Print      FLUoxetine (PROZAC) 20 MG capsule TAKE ONE CAPSULE BY MOUTH DAILY, Disp-30 capsule, R-11Normal      fluticasone (FLONASE) 50 MCG/ACT nasal spray PLACE TWO SPRAYS IN EACH NOSTRIL ONCE DAILY, Disp-1 Bottle, R-5Normal      hydrochlorothiazide (HYDRODIURIL) 25 MG tablet TAKE ONE TABLET BY MOUTH EVERY MORNING FOR ELEVATED BLOOD PRESSURE, Disp-30 tablet, R-5Normal      omeprazole (PRILOSEC) 40 MG delayed release capsule Take 1 capsule by mouth daily, Disp-30 capsule, R-11Normal             Signed:    Dominique Vazquez MD   12/28/2020

## 2020-12-28 NOTE — DISCHARGE SUMMARY
Hospital Medicine Discharge Summary    Patient ID: Shalonda hO      Patient's PCP: No primary care provider on file. Admit Date: 12/24/2020     Discharge Date: 12/25/2020      Admitting Physician: Gabrielle Wright MD     Discharge Physician: David Granado MD     Discharge Diagnoses: Active Hospital Problems    Diagnosis    Hypertensive urgency [I16.0]    Vision, loss, sudden, left [H53.132]    GERD (gastroesophageal reflux disease) [K21.9]    Hypertensive emergency [I16.1]    Sleep apnea [G47.30]       The patient was seen and examined on day of discharge and this discharge summary is in conjunction with any daily progress note from day of discharge. Hospital Course: apparently signed out AMA      Transient vision loss left eye  -Symptoms resolved  -Neurology consultation placed - MRI ordered and pending.    -Patient admitted under CVA pathway with every 4 hours neuro checks  -Initiate statin therapy  -Initiate ASA 81 mg daily     Malignant hypertensive urgency  -Suspect responsible for transient visual loss  -Patient weaned off Cardene GTT which was initiated in ED, now on low-dose Norvasc  -Echo ordered.  -Telemetry monitoring during stay  -Consultation placed to cardiology     GERD - w/out active signs/sxs of dysphagia/odynophagia. No evidence of active PUD or hx of GI bleed. Controlled on home PPI - continue.     History of prediabetes-A1c pending; patient euglycemic upon admission     Obesity -  With Body mass index is 33.3 kg/m². Complicating assessment and treatment. Placing patient at risk for multiple co-morbidities as well as early death and contributing to the patient's presentation. Counseled on weight loss.     Labs:  For convenience and continuity at follow-up the following most recent labs are provided:      CBC:    Lab Results   Component Value Date    WBC 10.6 12/25/2020    HGB 14.5 12/25/2020    HCT 43.1 12/25/2020     12/25/2020       Renal:    Lab Results Component Value Date     12/24/2020    K 4.0 12/24/2020     12/24/2020    CO2 25 12/24/2020    BUN 15 12/24/2020    CREATININE 1.4 12/24/2020    CALCIUM 9.0 12/24/2020         Significant Diagnostic Studies    Radiology:   CTA HEAD NECK W CONTRAST   Final Result   70% stenosis at the origin of the P2 segment of the left PCA. 40% stenosis in the P1 segment of right PCA. Up to 50% stenosis in the cavernous/supraclinoid segments of the bilateral   internal carotid arteries. No acute abnormality or flow-limiting stenosis of the major arteries of the   neck. Mild prominence of the right hilar lymph node, likely reactive. CT HEAD WO CONTRAST   Final Result   Atrophy, greatest in the bifrontal regions, greater than expected for patient   age. Mild small vessel ischemic disease. If there remains strong clinical   concern for acute on chronic ischemia, consider MR. Findings were discussed with Екатерина Rhodes at 10:00 on 12/24/2020. Consults:     IP CONSULT TO STROKE TEAM  IP CONSULT TO PHARMACY  PHARMACY TO CHANGE BASE FLUIDS  IP CONSULT TO HOSPITALIST  IP CONSULT TO SOCIAL WORK  IP CONSULT TO NEUROLOGY  IP CONSULT TO CARDIOLOGY    Disposition: home    Condition at Discharge: Stable    Discharge Instructions/Follow-up:  w/ PCP 1-2 weeks and subspecialists as arranged.      Code Status:  Full Code    Activity: activity as tolerated    Diet: regular diet      Discharge Medications:     Discharge Medication List as of 12/25/2020  5:03 PM           Details   aspirin 81 MG EC tablet Take 1 tablet by mouth daily, Disp-30 tablet, R-0Print      atorvastatin (LIPITOR) 40 MG tablet Take 1 tablet by mouth nightly, Disp-30 tablet, R-0Print      amLODIPine (NORVASC) 5 MG tablet Take 2 tablets by mouth daily, Disp-60 tablet, R-0Print              Details   FLUoxetine (PROZAC) 20 MG capsule TAKE ONE CAPSULE BY MOUTH DAILY, Disp-30 capsule, R-11Normal      fluticasone (FLONASE) 50 MCG/ACT nasal spray PLACE TWO SPRAYS IN EACH NOSTRIL ONCE DAILY, Disp-1 Bottle, R-5Normal      hydrochlorothiazide (HYDRODIURIL) 25 MG tablet TAKE ONE TABLET BY MOUTH EVERY MORNING FOR ELEVATED BLOOD PRESSURE, Disp-30 tablet, R-5Normal      omeprazole (PRILOSEC) 40 MG delayed release capsule Take 1 capsule by mouth daily, Disp-30 capsule, R-11Normal             Time Spent on discharge is more than 30 minutes in the examination, evaluation, counseling and review of medications and discharge plan.       Signed:    Librado Isaacs MD   12/28/2020

## 2021-01-28 RX ORDER — FLUOXETINE HYDROCHLORIDE 20 MG/1
CAPSULE ORAL
Qty: 30 CAPSULE | Refills: 0 | Status: SHIPPED | OUTPATIENT
Start: 2021-01-28 | End: 2021-02-02 | Stop reason: SDUPTHER

## 2021-01-28 RX ORDER — ATORVASTATIN CALCIUM 40 MG/1
40 TABLET, FILM COATED ORAL NIGHTLY
Qty: 30 TABLET | Refills: 0 | Status: SHIPPED | OUTPATIENT
Start: 2021-01-28 | End: 2021-02-02 | Stop reason: SDUPTHER

## 2021-01-28 RX ORDER — HYDROCHLOROTHIAZIDE 25 MG/1
TABLET ORAL
Qty: 30 TABLET | Refills: 0 | Status: SHIPPED | OUTPATIENT
Start: 2021-01-28 | End: 2021-02-02 | Stop reason: SDUPTHER

## 2021-01-28 RX ORDER — AMLODIPINE BESYLATE 5 MG/1
10 TABLET ORAL DAILY
Qty: 60 TABLET | Refills: 0 | Status: SHIPPED | OUTPATIENT
Start: 2021-01-28 | End: 2021-02-02 | Stop reason: SDUPTHER

## 2021-01-28 NOTE — TELEPHONE ENCOUNTER
Kameron Moriah has a NP appointment her on Monday and is out of his medications.      Meds pending appt is 02/02/2021

## 2021-02-02 ENCOUNTER — OFFICE VISIT (OUTPATIENT)
Dept: INTERNAL MEDICINE CLINIC | Age: 56
End: 2021-02-02

## 2021-02-02 VITALS
HEART RATE: 78 BPM | BODY MASS INDEX: 32.47 KG/M2 | TEMPERATURE: 98.3 F | OXYGEN SATURATION: 97 % | DIASTOLIC BLOOD PRESSURE: 90 MMHG | HEIGHT: 66 IN | SYSTOLIC BLOOD PRESSURE: 140 MMHG | WEIGHT: 202 LBS

## 2021-02-02 DIAGNOSIS — G45.9 TIA (TRANSIENT ISCHEMIC ATTACK): ICD-10-CM

## 2021-02-02 DIAGNOSIS — I10 HYPERTENSION, UNSPECIFIED TYPE: Primary | ICD-10-CM

## 2021-02-02 PROCEDURE — 99203 OFFICE O/P NEW LOW 30 MIN: CPT | Performed by: FAMILY MEDICINE

## 2021-02-02 RX ORDER — FLUOXETINE HYDROCHLORIDE 20 MG/1
CAPSULE ORAL
Qty: 30 CAPSULE | Refills: 5 | Status: SHIPPED | OUTPATIENT
Start: 2021-02-02 | End: 2021-08-03

## 2021-02-02 RX ORDER — ATORVASTATIN CALCIUM 40 MG/1
40 TABLET, FILM COATED ORAL NIGHTLY
Qty: 30 TABLET | Refills: 5 | Status: SHIPPED | OUTPATIENT
Start: 2021-02-02 | End: 2021-08-03

## 2021-02-02 RX ORDER — AMLODIPINE BESYLATE 5 MG/1
10 TABLET ORAL DAILY
Qty: 60 TABLET | Refills: 5 | Status: SHIPPED | OUTPATIENT
Start: 2021-02-02 | End: 2021-08-03

## 2021-02-02 RX ORDER — HYDROCHLOROTHIAZIDE 25 MG/1
TABLET ORAL
Qty: 30 TABLET | Refills: 5 | Status: SHIPPED | OUTPATIENT
Start: 2021-02-02 | End: 2021-08-03

## 2021-02-02 RX ORDER — OMEPRAZOLE 40 MG/1
40 CAPSULE, DELAYED RELEASE ORAL DAILY
Qty: 30 CAPSULE | Refills: 5 | Status: SHIPPED | OUTPATIENT
Start: 2021-02-02 | End: 2021-08-03

## 2021-02-02 NOTE — PROGRESS NOTES
Chief Complaint   Patient presents with   [facility]ail[facility] Doctor     no PCP, no insurance    Medication Refill     meds pending     Hypertension     Pt here for f/u s/p admission, summary copied below. Doing well since d/c, BPs controlled, feeling well, sx's resolved  Last MD visit 2 yrs ago, lost insurance and had been off HCTZ x1 yr at time of admission  Amlodipine added, also now on asa and statin  ROS otherwise unremarkable  Echo was advised to obtain as an outpt. Hospital Course:   47 yo male patient was admitted on 12/24 for transient vision loss of left eye.  He was also found to be in hypertensive urgency, was started on Cardene drip which was initiated in the ED and eventually weaned off and transferred to the floor on 12/25.   Neurology consulted.       Likely TIA secondary to thromboembolic event from poorly controlled hypertension and hypertensive urgency. Continue ASA, statin. F/u with PCP, neurology outpatient. Will also require 2D echo outpatient to rule out cardiac etiology.      HTN- instructed to continue amlodipine and HCTZ to control BP.      HLD- statin.      Past Medical History:   Diagnosis Date    Allergic rhinitis     Heartburn     Hypertension     Prediabetes 4/25/2018     Patient Active Problem List   Diagnosis    Sleep apnea    OCD (obsessive compulsive disorder)    Benign essential HTN    Seasonal allergic rhinitis    BMI 30.0-30.9,adult    Prediabetes    Hypertensive urgency    Vision, loss, sudden, left    GERD (gastroesophageal reflux disease)    Hypertensive emergency    Vision loss    Transient monocular blindness, left    TIA (transient ischemic attack)    Dyslipidemia       Current Outpatient Medications:     atorvastatin (LIPITOR) 40 MG tablet, Take 1 tablet by mouth nightly, Disp: 30 tablet, Rfl: 5    amLODIPine (NORVASC) 5 MG tablet, Take 2 tablets by mouth daily, Disp: 60 tablet, Rfl: 5 -     INFLUENZA, QUADV, 3 YRS AND OLDER, IM PF, PREFILL SYR OR SDV, 0.5ML (AFLURIA QUADV, PF)

## 2021-07-29 ENCOUNTER — TELEPHONE (OUTPATIENT)
Dept: INTERNAL MEDICINE CLINIC | Age: 56
End: 2021-07-29

## 2021-07-29 NOTE — TELEPHONE ENCOUNTER
Called Pt to reschedule his appt with Dr Ayanna Gandhi since Dr. Aracelis Baugh is not going to be in the office the month of August LM on VM

## 2021-08-30 RX ORDER — ATORVASTATIN CALCIUM 40 MG/1
TABLET, FILM COATED ORAL
Qty: 30 TABLET | Refills: 5 | Status: SHIPPED | OUTPATIENT
Start: 2021-08-30 | End: 2022-03-15 | Stop reason: SDUPTHER

## 2021-08-30 RX ORDER — OMEPRAZOLE 40 MG/1
CAPSULE, DELAYED RELEASE ORAL
Qty: 30 CAPSULE | Refills: 5 | Status: SHIPPED | OUTPATIENT
Start: 2021-08-30 | End: 2022-03-15 | Stop reason: SDUPTHER

## 2021-08-30 RX ORDER — AMLODIPINE BESYLATE 5 MG/1
TABLET ORAL
Qty: 60 TABLET | Refills: 5 | Status: SHIPPED | OUTPATIENT
Start: 2021-08-30 | End: 2022-03-15 | Stop reason: SDUPTHER

## 2021-08-30 RX ORDER — FLUOXETINE HYDROCHLORIDE 20 MG/1
CAPSULE ORAL
Qty: 30 CAPSULE | Refills: 5 | Status: SHIPPED | OUTPATIENT
Start: 2021-08-30 | End: 2021-09-28 | Stop reason: ALTCHOICE

## 2021-08-30 RX ORDER — HYDROCHLOROTHIAZIDE 25 MG/1
TABLET ORAL
Qty: 30 TABLET | Refills: 5 | Status: SHIPPED | OUTPATIENT
Start: 2021-08-30 | End: 2022-03-15 | Stop reason: SDUPTHER

## 2021-09-28 ENCOUNTER — OFFICE VISIT (OUTPATIENT)
Dept: INTERNAL MEDICINE CLINIC | Age: 56
End: 2021-09-28

## 2021-09-28 VITALS
BODY MASS INDEX: 31.18 KG/M2 | DIASTOLIC BLOOD PRESSURE: 80 MMHG | HEIGHT: 66 IN | WEIGHT: 194 LBS | SYSTOLIC BLOOD PRESSURE: 140 MMHG

## 2021-09-28 DIAGNOSIS — T88.7XXA MEDICATION SIDE EFFECT: ICD-10-CM

## 2021-09-28 DIAGNOSIS — I10 HYPERTENSION, UNSPECIFIED TYPE: Primary | ICD-10-CM

## 2021-09-28 DIAGNOSIS — F42.9 OBSESSIVE-COMPULSIVE DISORDER, UNSPECIFIED TYPE: ICD-10-CM

## 2021-09-28 DIAGNOSIS — F51.04 CHRONIC INSOMNIA: ICD-10-CM

## 2021-09-28 PROCEDURE — 99213 OFFICE O/P EST LOW 20 MIN: CPT | Performed by: FAMILY MEDICINE

## 2021-09-28 RX ORDER — ZOLPIDEM TARTRATE 10 MG/1
10 TABLET ORAL NIGHTLY PRN
Qty: 12 TABLET | Refills: 1 | Status: SHIPPED | OUTPATIENT
Start: 2021-09-28 | End: 2022-10-11 | Stop reason: SDUPTHER

## 2021-09-28 NOTE — PROGRESS NOTES
Chief Complaint   Patient presents with    Discuss Medications     thinks zoloft is causing hand tremors. started taking 1/2 of a pill for 3-4 days now     Insomnia     wants to talk about ambien. doing great on it when he can take it     Doing better with switch to zoloft with resolution of sexual SEs (from fluoxetine), also has been effective for OCD sx's  Started getting tremor after about a week ie difficulty with contacts putting in eyes- had family members with similar SEs so cut back to 1/2 tab with improvement of tremor  Still significant stress- difficult sleeping. ambien effective, no SEs, wakes up feeling refreshed, has been taking 2x per wk max as directed- lengthy discussion re sleep hygiene/techniques   Home BPs 120/70 typically    BP (!) 140/80   Ht 5' 6\" (1.676 m)   Wt 194 lb (88 kg)   BMI 31.31 kg/m²     A+Ox4, NAD, WD/WN  Conj clear, no icterus  OP clear  Neck supple, no LAD  Lungs CTA B  CV: RRR, no M/R/G, nl S1S  Abd: soft, NT/ND  Extr: No edema  Skin: warm dry, no rash no obvious lesions      Assessment/plan:     Angella Lin was seen today for discuss medications and insomnia. Diagnoses and all orders for this visit:    Hypertension, unspecified type  Stable, follow  Labs next visit 4-6 mo    Chronic insomnia  -     zolpidem (AMBIEN) 10 MG tablet; Take 1 tablet by mouth nightly as needed for Sleep for up to 12 doses.   Advise spring use, has been effective with QOL     Obsessive-compulsive disorder, unspecified type  Stable on zoloft now on 1/2 of 50mg tab    Medication side effect  Improved sexual SEs and tremor with current dose

## 2022-03-15 ENCOUNTER — OFFICE VISIT (OUTPATIENT)
Dept: INTERNAL MEDICINE CLINIC | Age: 57
End: 2022-03-15

## 2022-03-15 VITALS
HEART RATE: 74 BPM | SYSTOLIC BLOOD PRESSURE: 112 MMHG | WEIGHT: 196 LBS | OXYGEN SATURATION: 98 % | HEIGHT: 66 IN | BODY MASS INDEX: 31.5 KG/M2 | DIASTOLIC BLOOD PRESSURE: 64 MMHG

## 2022-03-15 DIAGNOSIS — M25.562 LEFT KNEE PAIN, UNSPECIFIED CHRONICITY: ICD-10-CM

## 2022-03-15 DIAGNOSIS — R73.03 PREDIABETES: ICD-10-CM

## 2022-03-15 DIAGNOSIS — E78.5 DYSLIPIDEMIA: ICD-10-CM

## 2022-03-15 DIAGNOSIS — I10 HYPERTENSION, UNSPECIFIED TYPE: Primary | ICD-10-CM

## 2022-03-15 PROCEDURE — 99214 OFFICE O/P EST MOD 30 MIN: CPT | Performed by: FAMILY MEDICINE

## 2022-03-15 RX ORDER — OMEPRAZOLE 40 MG/1
CAPSULE, DELAYED RELEASE ORAL
Qty: 30 CAPSULE | Refills: 5 | Status: SHIPPED | OUTPATIENT
Start: 2022-03-15 | End: 2022-06-23 | Stop reason: SDUPTHER

## 2022-03-15 RX ORDER — AMLODIPINE BESYLATE 5 MG/1
TABLET ORAL
Qty: 30 TABLET | Refills: 5 | Status: SHIPPED | OUTPATIENT
Start: 2022-03-15 | End: 2022-06-23 | Stop reason: SDUPTHER

## 2022-03-15 RX ORDER — ATORVASTATIN CALCIUM 40 MG/1
TABLET, FILM COATED ORAL
Qty: 30 TABLET | Refills: 5 | Status: SHIPPED | OUTPATIENT
Start: 2022-03-15 | End: 2022-06-23 | Stop reason: SDUPTHER

## 2022-03-15 RX ORDER — HYDROCHLOROTHIAZIDE 25 MG/1
TABLET ORAL
Qty: 30 TABLET | Refills: 5 | Status: SHIPPED | OUTPATIENT
Start: 2022-03-15 | End: 2022-06-23 | Stop reason: SDUPTHER

## 2022-03-15 NOTE — PROGRESS NOTES
Chief Complaint   Patient presents with    6 Month Follow-Up     ? about ED    Arthritis     L knee onset 3 months ago      [place] is a 55 y.o. male who presents today for follow up on his chronic medical conditions as noted below. Home BPs 120s/80s. Medial knee pain and some swelling- tylenol helped some. Worse when first standing up then gets better. Diffuse around patella. Onset few months ago. Patient Active Problem List   Diagnosis    Sleep apnea    OCD (obsessive compulsive disorder)    Benign essential HTN    Seasonal allergic rhinitis    BMI 30.0-30.9,adult    Prediabetes    Hypertensive urgency    Vision, loss, sudden, left    GERD (gastroesophageal reflux disease)    Hypertensive emergency    Vision loss    Transient monocular blindness, left    TIA (transient ischemic attack)    Dyslipidemia     Past Medical History:   Diagnosis Date    Allergic rhinitis     Heartburn     Hypertension     Prediabetes 4/25/2018        Current Outpatient Medications   Medication Sig Dispense Refill    sertraline (ZOLOFT) 50 MG tablet Takes 1/2 30 tablet 5    amLODIPine (NORVASC) 5 MG tablet 1 po qd 30 tablet 5    omeprazole (PRILOSEC) 40 MG delayed release capsule 1 po qd 30 capsule 5    atorvastatin (LIPITOR) 40 MG tablet 1 po qd 30 tablet 5    hydroCHLOROthiazide (HYDRODIURIL) 25 MG tablet TAKE ONE TABLET BY MOUTH EVERY MORNING FOR ELEVATED BLOOD PRESSURE 30 tablet 5    aspirin 81 MG EC tablet Take 1 tablet by mouth daily 30 tablet 0    fluticasone (FLONASE) 50 MCG/ACT nasal spray PLACE TWO SPRAYS IN EACH NOSTRIL ONCE DAILY 1 Bottle 5     No current facility-administered medications for this visit.        No Known Allergies    Social History     Tobacco Use    Smoking status: Never Smoker    Smokeless tobacco: Never Used   Substance Use Topics    Alcohol use: No        /64   Pulse 74   Ht 5' 6\" (1.676 m)   Wt 196 lb (88.9 kg)   SpO2 98%   BMI 31.64 kg/m² A+Ox4, NAD, WD/WN  Conj clear, no icterus  OP clear  Neck supple, no LAD  Lungs CTA B  CV: RRR, no M/R/G, nl S1S  Abd: soft, NT/ND  Extr: No edema  Skin: warm dry, no rash no obvious lesions  L knee with crepitus, NT, no swelling, FROM,     Assessment/plan:     Zaida Ortiz was seen today for 6 month follow-up and arthritis. Diagnoses and all orders for this visit:    Hypertension, unspecified type  -     Comprehensive Metabolic Panel  -     Lipid Panel  Stable controlled. Left knee pain, unspecified chronicity  ddx PFS/CMP, likely some underlying degenerative changes as well. Cont tylenol prn, could add voltaren gel if needed, discussed PT, ortho- pt not interested. Prediabetes    Dyslipidemia  Checking labs    Other orders  -     sertraline (ZOLOFT) 50 MG tablet;  Takes 1/2  -     amLODIPine (NORVASC) 5 MG tablet; 1 po qd  -     omeprazole (PRILOSEC) 40 MG delayed release capsule; 1 po qd  -     atorvastatin (LIPITOR) 40 MG tablet; 1 po qd  -     hydroCHLOROthiazide (HYDRODIURIL) 25 MG tablet; TAKE ONE TABLET BY MOUTH EVERY MORNING FOR ELEVATED BLOOD PRESSURE

## 2022-06-14 RX ORDER — ZOLPIDEM TARTRATE 10 MG/1
TABLET ORAL
Qty: 12 TABLET | OUTPATIENT
Start: 2022-06-14

## 2022-06-22 NOTE — TELEPHONE ENCOUNTER
Has a September follow up   [facility] and he has no refills, they were transferred to [facility]...     Requested Prescriptions     Pending Prescriptions Disp Refills    atorvastatin (LIPITOR) 40 MG tablet 30 tablet 5     Si po qd    amLODIPine (NORVASC) 5 MG tablet 30 tablet 5     Si po qd    omeprazole (PRILOSEC) 40 MG delayed release capsule 30 capsule 5     Si po qd    hydroCHLOROthiazide (HYDRODIURIL) 25 MG tablet 30 tablet 5     Sig: TAKE ONE TABLET BY MOUTH EVERY MORNING FOR ELEVATED BLOOD PRESSURE    sertraline (ZOLOFT) 50 MG tablet 30 tablet 5     Sig: Takes 1/2

## 2022-06-23 RX ORDER — ATORVASTATIN CALCIUM 40 MG/1
TABLET, FILM COATED ORAL
Qty: 30 TABLET | Refills: 5 | Status: SHIPPED | OUTPATIENT
Start: 2022-06-23

## 2022-06-23 RX ORDER — OMEPRAZOLE 40 MG/1
CAPSULE, DELAYED RELEASE ORAL
Qty: 30 CAPSULE | Refills: 5 | Status: SHIPPED | OUTPATIENT
Start: 2022-06-23

## 2022-06-23 RX ORDER — HYDROCHLOROTHIAZIDE 25 MG/1
TABLET ORAL
Qty: 30 TABLET | Refills: 5 | Status: SHIPPED | OUTPATIENT
Start: 2022-06-23

## 2022-06-23 RX ORDER — AMLODIPINE BESYLATE 5 MG/1
TABLET ORAL
Qty: 30 TABLET | Refills: 5 | Status: SHIPPED | OUTPATIENT
Start: 2022-06-23

## 2022-10-11 ENCOUNTER — OFFICE VISIT (OUTPATIENT)
Dept: INTERNAL MEDICINE CLINIC | Age: 57
End: 2022-10-11

## 2022-10-11 VITALS
HEIGHT: 66 IN | DIASTOLIC BLOOD PRESSURE: 82 MMHG | SYSTOLIC BLOOD PRESSURE: 140 MMHG | HEART RATE: 75 BPM | WEIGHT: 192 LBS | BODY MASS INDEX: 30.86 KG/M2 | OXYGEN SATURATION: 99 %

## 2022-10-11 DIAGNOSIS — I10 HYPERTENSION, UNSPECIFIED TYPE: Primary | ICD-10-CM

## 2022-10-11 DIAGNOSIS — F42.2 MIXED OBSESSIONAL THOUGHTS AND ACTS: ICD-10-CM

## 2022-10-11 DIAGNOSIS — F51.04 CHRONIC INSOMNIA: ICD-10-CM

## 2022-10-11 DIAGNOSIS — E78.49 OTHER HYPERLIPIDEMIA: ICD-10-CM

## 2022-10-11 PROCEDURE — 99214 OFFICE O/P EST MOD 30 MIN: CPT | Performed by: FAMILY MEDICINE

## 2022-10-11 RX ORDER — ZOLPIDEM TARTRATE 10 MG/1
10 TABLET ORAL NIGHTLY PRN
Qty: 12 TABLET | Refills: 1 | Status: SHIPPED | OUTPATIENT
Start: 2022-10-11 | End: 2022-12-11

## 2022-10-11 NOTE — PROGRESS NOTES
Chief Complaint   Patient presents with    Hypertension     Sita Mcmahan is a 65 y.o. male who presents today for follow up on his chronic medical conditions as noted below. Doing well, no acute c/o. No changes in meds or health status since last visit. Home BPs 120s/80s  Chronic intermittent insomnia, otc meds ineffective, has taken ambien sparingly in the past, tolerates and effective  Had labs done- look good. Patient Active Problem List   Diagnosis    Sleep apnea    OCD (obsessive compulsive disorder)    Hypertension    Seasonal allergic rhinitis    BMI 30.0-30.9,adult    Prediabetes    Hypertensive urgency    Vision, loss, sudden, left    GERD (gastroesophageal reflux disease)    Hypertensive emergency    Vision loss    Transient monocular blindness, left    TIA (transient ischemic attack)    Dyslipidemia    Other hyperlipidemia    Chronic insomnia     Past Medical History:   Diagnosis Date    Allergic rhinitis     Heartburn     Hypertension     Prediabetes 4/25/2018        Current Outpatient Medications   Medication Sig Dispense Refill    zolpidem (AMBIEN) 10 MG tablet Take 1 tablet by mouth nightly as needed for Sleep for up to 12 doses. 12 tablet 1    atorvastatin (LIPITOR) 40 MG tablet 1 po qd 30 tablet 5    amLODIPine (NORVASC) 5 MG tablet 1 po qd 30 tablet 5    omeprazole (PRILOSEC) 40 MG delayed release capsule 1 po qd 30 capsule 5    hydroCHLOROthiazide (HYDRODIURIL) 25 MG tablet TAKE ONE TABLET BY MOUTH EVERY MORNING FOR ELEVATED BLOOD PRESSURE 30 tablet 5    sertraline (ZOLOFT) 50 MG tablet Takes 1/2 30 tablet 5    aspirin 81 MG EC tablet Take 1 tablet by mouth daily 30 tablet 0    fluticasone (FLONASE) 50 MCG/ACT nasal spray PLACE TWO SPRAYS IN EACH NOSTRIL ONCE DAILY 1 Bottle 5     No current facility-administered medications for this visit.        No Known Allergies    Social History     Tobacco Use    Smoking status: Never    Smokeless tobacco: Never   Substance Use Topics    Alcohol use: No        BP (!) 140/82 (Site: Left Upper Arm, Position: Sitting, Cuff Size: Medium Adult)   Pulse 75   Ht 5' 6\" (1.676 m)   Wt 192 lb (87.1 kg)   SpO2 99%   BMI 30.99 kg/m²   Repeat 140/80  A+Ox4, NAD, WD/WN  Conj clear, no icterus  OP clear  Neck supple, no LAD  Lungs CTA B  CV: RRR, no M/R/G, nl S1S  Abd: benign  Extr: No edema  Skin: warm dry, no rash no obvious lesions      Assessment/plan:     Latonya Edwards was seen today for hypertension. Diagnoses and all orders for this visit:    Hypertension, unspecified type  Stable, doing well    Other hyperlipidemia  See labs just done (in media), doing well    OCD/Mixed obsessional thoughts and acts  Stable doing well    Chronic insomnia  -     zolpidem (AMBIEN) 10 MG tablet; Take 1 tablet by mouth nightly as needed for Sleep for up to 12 doses.

## 2022-12-18 RX ORDER — OMEPRAZOLE 40 MG/1
CAPSULE, DELAYED RELEASE ORAL
Qty: 30 CAPSULE | Refills: 5 | Status: SHIPPED | OUTPATIENT
Start: 2022-12-18

## 2022-12-18 RX ORDER — ATORVASTATIN CALCIUM 40 MG/1
TABLET, FILM COATED ORAL
Qty: 30 TABLET | Refills: 5 | Status: SHIPPED | OUTPATIENT
Start: 2022-12-18

## 2022-12-18 RX ORDER — AMLODIPINE BESYLATE 5 MG/1
TABLET ORAL
Qty: 30 TABLET | Refills: 5 | Status: SHIPPED | OUTPATIENT
Start: 2022-12-18

## 2022-12-18 RX ORDER — HYDROCHLOROTHIAZIDE 25 MG/1
TABLET ORAL
Qty: 30 TABLET | Refills: 5 | Status: SHIPPED | OUTPATIENT
Start: 2022-12-18

## 2023-03-08 ENCOUNTER — OFFICE VISIT (OUTPATIENT)
Dept: INTERNAL MEDICINE CLINIC | Age: 58
End: 2023-03-08

## 2023-03-08 VITALS
DIASTOLIC BLOOD PRESSURE: 64 MMHG | HEIGHT: 66 IN | WEIGHT: 192 LBS | SYSTOLIC BLOOD PRESSURE: 118 MMHG | HEART RATE: 74 BPM | BODY MASS INDEX: 30.86 KG/M2 | OXYGEN SATURATION: 98 %

## 2023-03-08 DIAGNOSIS — L03.119 CELLULITIS AND ABSCESS OF LEG: ICD-10-CM

## 2023-03-08 DIAGNOSIS — L02.419 CELLULITIS AND ABSCESS OF LEG: ICD-10-CM

## 2023-03-08 DIAGNOSIS — I10 HYPERTENSION, UNSPECIFIED TYPE: Primary | ICD-10-CM

## 2023-03-08 DIAGNOSIS — E78.5 DYSLIPIDEMIA: ICD-10-CM

## 2023-03-08 PROCEDURE — 99214 OFFICE O/P EST MOD 30 MIN: CPT | Performed by: INTERNAL MEDICINE

## 2023-03-08 PROCEDURE — 3078F DIAST BP <80 MM HG: CPT | Performed by: INTERNAL MEDICINE

## 2023-03-08 PROCEDURE — 3074F SYST BP LT 130 MM HG: CPT | Performed by: INTERNAL MEDICINE

## 2023-03-08 RX ORDER — ATORVASTATIN CALCIUM 40 MG/1
TABLET, FILM COATED ORAL
Qty: 30 TABLET | Refills: 5 | Status: SHIPPED | OUTPATIENT
Start: 2023-03-08

## 2023-03-08 RX ORDER — HYDROCHLOROTHIAZIDE 25 MG/1
TABLET ORAL
Qty: 30 TABLET | Refills: 5 | Status: SHIPPED | OUTPATIENT
Start: 2023-03-08

## 2023-03-08 RX ORDER — AMLODIPINE BESYLATE 5 MG/1
TABLET ORAL
Qty: 30 TABLET | Refills: 5 | Status: SHIPPED | OUTPATIENT
Start: 2023-03-08

## 2023-03-08 RX ORDER — OMEPRAZOLE 40 MG/1
CAPSULE, DELAYED RELEASE ORAL
Qty: 30 CAPSULE | Refills: 5 | Status: SHIPPED | OUTPATIENT
Start: 2023-03-08

## 2023-03-08 RX ORDER — SULFAMETHOXAZOLE AND TRIMETHOPRIM 800; 160 MG/1; MG/1
1 TABLET ORAL 2 TIMES DAILY
Qty: 20 TABLET | Refills: 0 | Status: SHIPPED | OUTPATIENT
Start: 2023-03-08 | End: 2023-03-18

## 2023-03-08 ASSESSMENT — PATIENT HEALTH QUESTIONNAIRE - PHQ9
SUM OF ALL RESPONSES TO PHQ QUESTIONS 1-9: 0
SUM OF ALL RESPONSES TO PHQ QUESTIONS 1-9: 0
2. FEELING DOWN, DEPRESSED OR HOPELESS: 0
1. LITTLE INTEREST OR PLEASURE IN DOING THINGS: 0
SUM OF ALL RESPONSES TO PHQ QUESTIONS 1-9: 0
SUM OF ALL RESPONSES TO PHQ QUESTIONS 1-9: 0
SUM OF ALL RESPONSES TO PHQ9 QUESTIONS 1 & 2: 0

## 2023-03-08 ASSESSMENT — ENCOUNTER SYMPTOMS
COUGH: 0
SHORTNESS OF BREATH: 0

## 2023-03-08 NOTE — PROGRESS NOTES
Carmina Khan (:  1965) is a 65 y.o. male,, here for evaluation of the following chief complaint(s):  Leg Problem (L lower leg, leg is peeling and dry open sores. Onset a month ago. ) and Rash (On arms and back, very itchy )         ASSESSMENT/PLAN:  1. Hypertension, unspecified type  2. Dyslipidemia  3. Cellulitis and abscess of leg  1. Hypertension well-controlled continue same medicines   2. Hyperlipidemia previously well controlled blood tests were done 2022. Continue present medicines  3. Probable cellulitis of his lower extremity he did show pictures showing the lower leg was previously very red and tender to palpation 2 to 3 days ago. I did encourage him to use a support hose on his left lower leg to help prevent any swelling and will start him on Bactrim to treat presumed cellulitis. Hopefully the Bactrim will also help the other skin spots. Otherwise I would be concerned of scabies for the skin spots. He will call if symptoms are persistent. Return in about 6 months (around 2023). Subjective   SUBJECTIVE/OBJECTIVE:  HPI patient in for evaluation of skin irritation and soreness in his left lower leg. He states he has had a spot on his leg that is been dry and crusty which he occasionally scratches. Recently got very red hot and swollen and has improved slightly but still has a lot of crusty changes across his leg with some tenderness and irritation. Further he has spots of irritation on his arms and back that he wants evaluated. He also is nearly due for his 6-month checkup regarding hypertension and hyperlipidemia. He denies any new medical concerns other than the skin changes. Review of Systems   Respiratory:  Negative for cough and shortness of breath. Cardiovascular:  Negative for chest pain and palpitations. All other systems reviewed and are negative. Objective   Physical Exam  Vitals reviewed.    Constitutional:       Appearance: Normal appearance. Neck:      Vascular: No carotid bruit. Cardiovascular:      Rate and Rhythm: Normal rate and regular rhythm. Pulses: Normal pulses. Heart sounds: Normal heart sounds. No murmur heard. Pulmonary:      Effort: Pulmonary effort is normal.      Breath sounds: Normal breath sounds. Abdominal:      General: Abdomen is flat. Palpations: Abdomen is soft. Musculoskeletal:         General: Normal range of motion. Cervical back: Normal range of motion and neck supple. Left lower leg: Edema present. Comments: He did have mild swelling of his left lower extremity compared to right. No significant evidence of varicose veins. Skin:     Findings: Rash present. Comments: He had areas of redness and scaling and mildly tender to palpation from the lateral aspect above his ankle on the left leg extending to the medial aspect anteriorly. There was no open draining areas but it appeared to be smoldering cellulitis. He also had spots across his back and arms that were infrequent but mildly red with a crusty center. Neurological:      Mental Status: He is alert. An electronic signature was used to authenticate this note.     --Angella Nguyễn MD

## 2023-03-20 ENCOUNTER — OFFICE VISIT (OUTPATIENT)
Dept: INTERNAL MEDICINE CLINIC | Age: 58
End: 2023-03-20

## 2023-03-20 VITALS
OXYGEN SATURATION: 98 % | HEART RATE: 78 BPM | SYSTOLIC BLOOD PRESSURE: 118 MMHG | BODY MASS INDEX: 30.86 KG/M2 | WEIGHT: 192 LBS | HEIGHT: 66 IN | DIASTOLIC BLOOD PRESSURE: 64 MMHG

## 2023-03-20 DIAGNOSIS — K21.9 GASTRIC REFLUX: ICD-10-CM

## 2023-03-20 DIAGNOSIS — L03.116 CELLULITIS OF LEFT LOWER EXTREMITY: ICD-10-CM

## 2023-03-20 DIAGNOSIS — D50.9 MICROCYTIC ANEMIA: Primary | ICD-10-CM

## 2023-03-20 DIAGNOSIS — I10 PRIMARY HYPERTENSION: ICD-10-CM

## 2023-03-20 PROCEDURE — 3074F SYST BP LT 130 MM HG: CPT | Performed by: INTERNAL MEDICINE

## 2023-03-20 PROCEDURE — 3078F DIAST BP <80 MM HG: CPT | Performed by: INTERNAL MEDICINE

## 2023-03-20 PROCEDURE — 99214 OFFICE O/P EST MOD 30 MIN: CPT | Performed by: INTERNAL MEDICINE

## 2023-03-20 RX ORDER — MUPIROCIN CALCIUM 20 MG/G
CREAM TOPICAL 3 TIMES DAILY
Qty: 2 EACH | Refills: 2 | Status: SHIPPED | OUTPATIENT
Start: 2023-03-20 | End: 2023-04-03

## 2023-03-20 NOTE — PROGRESS NOTES
SUBJECTIVE:   Follow up from 3/8 (Dr Agus Garcia) for left lower extremity cellulitis treated with Bactrim x 10 days, completed on Saturday. Left leg is still seeping. MEDICATIONS:  amLODIPine (NORVASC) 5 MG tablet TAKE ONE TABLET BY MOUTH DAILY   omeprazole (PRILOSEC) 40 MG delayed release capsule TAKE ONE CAPSULE BY MOUTH DAILY   atorvastatin (LIPITOR) 40 MG tablet TAKE ONE TABLET BY MOUTH DAILY   hydrochlorothiazide (HYDRODIURIL) 25 MG tablet TAKE ONE TABLET BY MOUTH EVERY MORNING   aspirin 81 MG EC tablet Take 1 tablet by mouth daily   fluticasone (FLONASE) 50 MCG/ACT nasal spray PLACE TWO SPRAYS IN [facility] NOSTRIL ONCE DAILY       Lab Results   Component Value Date    LABA1C 5.4 12/25/2020     Lab Results   Component Value Date    WBC 10.6 12/25/2020    HGB 14.5 12/25/2020     12/25/2020    MCV 77.8 (L) 12/25/2020     Lab Results   Component Value Date     12/24/2020    K 4.0 12/24/2020     12/24/2020    CO2 25 12/24/2020    BUN 15 12/24/2020    CREATININE 1.4 (H) 12/24/2020    GLUCOSE 106 (H) 12/24/2020       OBJECTIVE:    /64   Pulse 78   Ht 5' 6\" (1.676 m)   Wt 192 lb (87.1 kg)   SpO2 98%   BMI 30.99 kg/m²   HEENT:  Oropharynx clear, no lymphadenopathy,   LUNGS:  Clear and without wheezes  HEART:  Regular rhythm, no appreciable murmur  ABD:  Benign, active bowel sounds  EXT:  2+ edema with venous stasis, left lower extremity minimal erythema, neurovascular status intact  NEURO:  No focal neurologic deficits noted. ASSESSMENT / PLAN:   Left lower extremity cellulitis with overlying skin changes:  Cover with topical mupirocin (Bactroban) 2% cream THREE times per day for two weeks (14 days). Hypertension:  Blood pressure good today at 118/64, goal less than 130/80. Continues taking amlodipine (Norvasc) 5 mg daily and hydrochlorothiazide 25 mg daily. Gastric reflux:  Continue taking omeprazole (Prilosec) 40 mg daily. Check a renal function panel. Dyslipidemia (LDL 68).

## 2023-03-20 NOTE — PATIENT INSTRUCTIONS
Left lower extremity cellulitis with overlying skin changes:  Cover with topical mupirocin (Bactroban) 2% cream THREE times per day for two weeks (14 days). Hypertension:  Blood pressure good today at 118/64, goal less than 130/80. Continues taking amlodipine (Norvasc) 5 mg daily and hydrochlorothiazide 25 mg daily. Gastric reflux:  Continue taking omeprazole (Prilosec) 40 mg daily. Check a renal function panel. Microcytosis without anemia:  Consider checking a ferritin (iron level). Consider screening colonoscopy.         Follow-up as needed

## 2023-09-12 ENCOUNTER — OFFICE VISIT (OUTPATIENT)
Dept: INTERNAL MEDICINE CLINIC | Age: 58
End: 2023-09-12

## 2023-09-12 VITALS
OXYGEN SATURATION: 99 % | BODY MASS INDEX: 30.86 KG/M2 | WEIGHT: 192 LBS | HEIGHT: 66 IN | SYSTOLIC BLOOD PRESSURE: 160 MMHG | DIASTOLIC BLOOD PRESSURE: 80 MMHG | HEART RATE: 79 BPM

## 2023-09-12 DIAGNOSIS — N52.9 ERECTILE DYSFUNCTION, UNSPECIFIED ERECTILE DYSFUNCTION TYPE: ICD-10-CM

## 2023-09-12 DIAGNOSIS — Z13.1 SCREENING FOR DIABETES MELLITUS (DM): Primary | ICD-10-CM

## 2023-09-12 DIAGNOSIS — L28.0 LSC (LICHEN SIMPLEX CHRONICUS): ICD-10-CM

## 2023-09-12 DIAGNOSIS — I10 HYPERTENSION, UNSPECIFIED TYPE: ICD-10-CM

## 2023-09-12 DIAGNOSIS — L30.9 ECZEMA, UNSPECIFIED TYPE: ICD-10-CM

## 2023-09-12 DIAGNOSIS — E78.5 HYPERLIPIDEMIA, UNSPECIFIED HYPERLIPIDEMIA TYPE: ICD-10-CM

## 2023-09-12 LAB — HBA1C MFR BLD: 5.5 %

## 2023-09-12 PROCEDURE — 3079F DIAST BP 80-89 MM HG: CPT | Performed by: FAMILY MEDICINE

## 2023-09-12 PROCEDURE — 99214 OFFICE O/P EST MOD 30 MIN: CPT | Performed by: FAMILY MEDICINE

## 2023-09-12 PROCEDURE — 83036 HEMOGLOBIN GLYCOSYLATED A1C: CPT | Performed by: FAMILY MEDICINE

## 2023-09-12 PROCEDURE — 3077F SYST BP >= 140 MM HG: CPT | Performed by: FAMILY MEDICINE

## 2023-09-12 RX ORDER — AMLODIPINE BESYLATE 5 MG/1
TABLET ORAL
Qty: 30 TABLET | Refills: 5 | Status: SHIPPED | OUTPATIENT
Start: 2023-09-12

## 2023-09-12 RX ORDER — TADALAFIL 5 MG/1
TABLET ORAL
Qty: 15 TABLET | Refills: 1 | Status: SHIPPED | OUTPATIENT
Start: 2023-09-12

## 2023-09-12 RX ORDER — OMEPRAZOLE 40 MG/1
CAPSULE, DELAYED RELEASE ORAL
Qty: 30 CAPSULE | Refills: 5 | Status: SHIPPED | OUTPATIENT
Start: 2023-09-12

## 2023-09-12 RX ORDER — MOMETASONE FUROATE 1 MG/G
CREAM TOPICAL
Qty: 60 G | Refills: 1 | Status: SHIPPED | OUTPATIENT
Start: 2023-09-12

## 2023-09-12 RX ORDER — HYDROCHLOROTHIAZIDE 25 MG/1
TABLET ORAL
Qty: 30 TABLET | Refills: 5 | Status: SHIPPED | OUTPATIENT
Start: 2023-09-12

## 2023-09-12 RX ORDER — ATORVASTATIN CALCIUM 40 MG/1
TABLET, FILM COATED ORAL
Qty: 30 TABLET | Refills: 5 | Status: SHIPPED | OUTPATIENT
Start: 2023-09-12

## 2024-03-12 ENCOUNTER — OFFICE VISIT (OUTPATIENT)
Dept: INTERNAL MEDICINE CLINIC | Age: 59
End: 2024-03-12

## 2024-03-12 VITALS
WEIGHT: 192 LBS | HEIGHT: 66 IN | SYSTOLIC BLOOD PRESSURE: 142 MMHG | DIASTOLIC BLOOD PRESSURE: 80 MMHG | BODY MASS INDEX: 30.86 KG/M2

## 2024-03-12 DIAGNOSIS — I10 HYPERTENSION, UNSPECIFIED TYPE: ICD-10-CM

## 2024-03-12 DIAGNOSIS — D64.9 ANEMIA, UNSPECIFIED TYPE: Primary | ICD-10-CM

## 2024-03-12 DIAGNOSIS — K21.9 GASTROESOPHAGEAL REFLUX DISEASE WITHOUT ESOPHAGITIS: ICD-10-CM

## 2024-03-12 PROCEDURE — 99213 OFFICE O/P EST LOW 20 MIN: CPT | Performed by: FAMILY MEDICINE

## 2024-03-12 PROCEDURE — 3079F DIAST BP 80-89 MM HG: CPT | Performed by: FAMILY MEDICINE

## 2024-03-12 PROCEDURE — 3077F SYST BP >= 140 MM HG: CPT | Performed by: FAMILY MEDICINE

## 2024-03-12 RX ORDER — TADALAFIL 5 MG/1
5 TABLET ORAL DAILY
Qty: 15 TABLET | Refills: 3 | Status: SHIPPED | OUTPATIENT
Start: 2024-03-12

## 2024-03-12 NOTE — PROGRESS NOTES
87.1 kg (192 lb)   BMI 30.99 kg/m²     A+Ox4, NAD, WD/WN  Conj clear, no icterus  OP clear  Neck supple, no LAD  Lungs CTA B  CV: RRR, no M/R/G, nl S1S  Abd: soft, NT/ND  Extr: No edema  Skin: warm dry, no rash no obvious lesions    Assessment/plan:     Sherif was seen today for hypertension.    Diagnoses and all orders for this visit:    Anemia, microcytic  Will repeat CBC today  Advise colonoscopy/EGD, for cost reasons pt will await cbc results first.    Discussed potential etiologies, close f/u on this issue advised    Hypertension, unspecified type  Stable    Gastroesophageal reflux disease without esophagitis  Stable, asymptomatic.    Other orders  -     tadalafil (CIALIS) 5 MG tablet; Take 1 tablet by mouth daily

## 2024-03-26 ENCOUNTER — TELEPHONE (OUTPATIENT)
Dept: INTERNAL MEDICINE CLINIC | Age: 59
End: 2024-03-26

## 2024-03-26 NOTE — TELEPHONE ENCOUNTER
Please call pt to make sure he follows through on CBC ordered at our visit.  This is very important given prior CBC showing anemia.  Given prior findings advise need for colonoscopy, EGD but first obtain CBC.    If CBC has been completed somewhere else ie outside facility please obtain result.

## 2024-04-01 RX ORDER — AMLODIPINE BESYLATE 5 MG/1
TABLET ORAL
Qty: 30 TABLET | Refills: 5 | Status: SHIPPED | OUTPATIENT
Start: 2024-04-01

## 2024-04-09 ENCOUNTER — TELEPHONE (OUTPATIENT)
Dept: INTERNAL MEDICINE CLINIC | Age: 59
End: 2024-04-09

## 2024-04-09 DIAGNOSIS — D64.9 ANEMIA, UNSPECIFIED TYPE: Primary | ICD-10-CM

## 2024-04-09 NOTE — TELEPHONE ENCOUNTER
Called [facility] and patient still did not get follow up labs drawn. Left message for Chaka Runner on his cell phone.

## 2024-05-02 ENCOUNTER — TELEPHONE (OUTPATIENT)
Dept: INTERNAL MEDICINE CLINIC | Age: 59
End: 2024-05-02

## 2024-05-07 ENCOUNTER — TELEPHONE (OUTPATIENT)
Dept: INTERNAL MEDICINE CLINIC | Age: 59
End: 2024-05-07

## 2024-05-07 DIAGNOSIS — D64.9 ANEMIA, UNSPECIFIED TYPE: Primary | ICD-10-CM

## 2024-05-07 NOTE — TELEPHONE ENCOUNTER
----- Message from Alberto Payan MD sent at 5/7/2024 11:05 AM EDT -----  Please call-  anemia (low RBCs).  Very strongly advise GI eval with EGD and colonoscopy.  Please refer to Dr Horta group asap.  Let us know if any difficulty getting in or other obstacles.

## 2024-05-07 NOTE — TELEPHONE ENCOUNTER
Spoke to Dr. Horta and he would like to order Iron studies. Also needs to have an EGD and colonoscopy.     Sherif is aware and will get the iron studies done tomorrow. Orders will be faxed to [facility] on [address]. Patient will look into getting a colonoscopy and EGD states wife should be getting insurance this summer.

## 2024-05-09 ENCOUNTER — TELEPHONE (OUTPATIENT)
Dept: INTERNAL MEDICINE CLINIC | Age: 59
End: 2024-05-09

## 2024-05-13 NOTE — TELEPHONE ENCOUNTER
Patient:  Robert G Runner   :   1965   PCP:   Anai Chen MD  Date:    2024    This 59 y.o. male .I am aware of a request for evaluation regarding \"microcytic anemia\"  Allergies: No Known Allergies    Recent Data  24 W6.7 H9.8 MCV66 P269  24 Iron 25 / [id number] = 6% Sat       Thus, it appears that he has Iron Deficiency Anemia       Plan:   It is reasonable to perform Colonoscopy, Esophagogastroduodenoscopy as first step. I will be available to do so.    VICKY Horta Jr. MD       [facility]  [phone]

## 2024-05-13 NOTE — TELEPHONE ENCOUNTER
Patient:  Robert G Runner   :   1965   PCP:   Anai Chen MD  Date:    2024    This 59 y.o. male .I am aware of a request for evaluation regarding \"microcytic anemia\"  Allergies: No Known Allergies    Recent Data  24 W6.7 H9.8 MCV66 P269  24 Iron 25 / [id number] = 6% Sat       Thus, it appears that he has Iron Deficiency Anemia       Plan:   It is reasonable to perform Colonoscopy, Esophagogastroduodenoscopy as first step. I will be available to do so.    VICKY Horta Jr. MD       [facility]  [phone]

## 2024-06-06 RX ORDER — ATORVASTATIN CALCIUM 40 MG/1
TABLET, FILM COATED ORAL
Qty: 30 TABLET | Refills: 5 | Status: SHIPPED | OUTPATIENT
Start: 2024-06-06

## 2024-06-06 RX ORDER — HYDROCHLOROTHIAZIDE 25 MG/1
TABLET ORAL
Qty: 30 TABLET | Refills: 5 | Status: SHIPPED | OUTPATIENT
Start: 2024-06-06

## 2024-06-06 NOTE — TELEPHONE ENCOUNTER
Requested Prescriptions     Pending Prescriptions Disp Refills    hydroCHLOROthiazide (HYDRODIURIL) 25 MG tablet 30 tablet 5     Sig: TAKE ONE TABLET BY MOUTH EVERY MORNING    atorvastatin (LIPITOR) 40 MG tablet 30 tablet 5     Sig: TAKE ONE TABLET BY MOUTH DAILY      Follow  up scheduled 09/10/2024

## 2024-06-10 RX ORDER — OMEPRAZOLE 40 MG/1
CAPSULE, DELAYED RELEASE ORAL
Qty: 30 CAPSULE | Refills: 5 | Status: SHIPPED | OUTPATIENT
Start: 2024-06-10

## 2024-06-10 NOTE — TELEPHONE ENCOUNTER
Requested Prescriptions     Pending Prescriptions Disp Refills    omeprazole (PRILOSEC) 40 MG delayed release capsule 30 capsule 5     Sig: TAKE ONE CAPSULE BY MOUTH DAILY    Med continued seen in 03/2024

## 2024-09-19 RX ORDER — AMLODIPINE BESYLATE 5 MG/1
TABLET ORAL
Qty: 30 TABLET | Refills: 5 | Status: SHIPPED | OUTPATIENT
Start: 2024-09-19

## 2024-10-08 ENCOUNTER — OFFICE VISIT (OUTPATIENT)
Dept: INTERNAL MEDICINE CLINIC | Age: 59
End: 2024-10-08

## 2024-10-08 VITALS
OXYGEN SATURATION: 98 % | HEIGHT: 66 IN | BODY MASS INDEX: 28.28 KG/M2 | HEART RATE: 75 BPM | WEIGHT: 176 LBS | SYSTOLIC BLOOD PRESSURE: 150 MMHG | DIASTOLIC BLOOD PRESSURE: 82 MMHG

## 2024-10-08 DIAGNOSIS — I10 HYPERTENSION, UNSPECIFIED TYPE: ICD-10-CM

## 2024-10-08 DIAGNOSIS — F41.0 GENERALIZED ANXIETY DISORDER WITH PANIC ATTACKS: ICD-10-CM

## 2024-10-08 DIAGNOSIS — F32.A DEPRESSION, UNSPECIFIED DEPRESSION TYPE: ICD-10-CM

## 2024-10-08 DIAGNOSIS — F41.1 GENERALIZED ANXIETY DISORDER WITH PANIC ATTACKS: ICD-10-CM

## 2024-10-08 DIAGNOSIS — R63.4 WEIGHT LOSS: ICD-10-CM

## 2024-10-08 DIAGNOSIS — F41.0 PANIC DISORDER: ICD-10-CM

## 2024-10-08 DIAGNOSIS — D50.9 MICROCYTIC ANEMIA: Primary | ICD-10-CM

## 2024-10-08 PROCEDURE — 99214 OFFICE O/P EST MOD 30 MIN: CPT | Performed by: FAMILY MEDICINE

## 2024-10-08 PROCEDURE — 3079F DIAST BP 80-89 MM HG: CPT | Performed by: FAMILY MEDICINE

## 2024-10-08 PROCEDURE — 3077F SYST BP >= 140 MM HG: CPT | Performed by: FAMILY MEDICINE

## 2024-10-08 RX ORDER — ESCITALOPRAM OXALATE 10 MG/1
10 TABLET ORAL DAILY
Qty: 30 TABLET | Refills: 5 | Status: SHIPPED | OUTPATIENT
Start: 2024-10-08

## 2024-10-08 NOTE — PROGRESS NOTES
Robert G Runner is a 59 y.o. male who presents today for follow up on his chronic medical conditions as noted below.  Concerns regarding mood, anxiety x 4-5 months worsening  Panic attacks, anxietyfrequent  Occur at least once a wk, develops full panic attack.  Decreased appetite, wt loss 15lb, poor appetite, not eating as much  Feelings of depression, tearfulness frequent  Memory loss, inattentive.  Some lightheadedness, shakiness, feelings of warmth flush over face    Hx of sertraline-  caused hands shaking couldn't do contacts, also hx wellbutrin decades ago, sexual side effects.  Off SSRI  x many months now.    Discussed anemia from prior visit.    Hasn't been able to get colonoscopy done stating due to cost constraints, still waiting for insurance.         Patient Active Problem List   Diagnosis    Sleep apnea    OCD (obsessive compulsive disorder)    Hypertension    Seasonal allergic rhinitis    BMI 30.0-30.9,adult    Prediabetes    Hypertensive urgency    Vision, loss, sudden, left    GERD (gastroesophageal reflux disease)    Hypertensive emergency    Vision loss    Transient monocular blindness, left    TIA (transient ischemic attack)    Dyslipidemia    Other hyperlipidemia    Chronic insomnia    Eczema     Past Medical History:   Diagnosis Date    Allergic rhinitis     Eczema 9/12/2023    Heartburn     Hypertension     Prediabetes 4/25/2018        Current Outpatient Medications   Medication Sig Dispense Refill    escitalopram (LEXAPRO) 10 MG tablet Take 1 tablet by mouth daily 30 tablet 5    amLODIPine (NORVASC) 5 MG tablet TAKE ONE TABLET BY MOUTH DAILY 30 tablet 5    omeprazole (PRILOSEC) 40 MG delayed release capsule TAKE ONE CAPSULE BY MOUTH DAILY 30 capsule 5    hydroCHLOROthiazide (HYDRODIURIL) 25 MG tablet TAKE ONE TABLET BY MOUTH EVERY MORNING 30 tablet 5    atorvastatin (LIPITOR) 40 MG tablet TAKE ONE TABLET BY MOUTH DAILY 30 tablet 5    tadalafil (CIALIS) 5 MG tablet 1-2 po prn qd as directed. 15

## 2024-10-09 ENCOUNTER — TELEPHONE (OUTPATIENT)
Dept: INTERNAL MEDICINE CLINIC | Age: 59
End: 2024-10-09

## 2024-10-09 DIAGNOSIS — D50.9 MICROCYTIC ANEMIA: Primary | ICD-10-CM

## 2024-10-09 NOTE — PROGRESS NOTES
.  Date and time of surgery : 10/17/2024 at 1000 am             Arrival Time:  0900 am     Bring Picture ID and insurance card.  Please wear simple, loose fitting clothing to the hospital.   Do not bring valuables (money, credit cards, checkbooks, etc.)   DO NOT wear any jewelry or piercings on day of surgery.  All body piercing jewelry must be removed.  If you have dentures, they will be removed before going to the OR; we will provide you a container.  If you wear contact lenses or glasses, they will be removed; please bring a case for them.  Shower the evening before or morning of surgery with antibacterial soap.  Nothing to eat or drink after midnight the day before surgery.  FOLLOW DR NOLA TAYLOR INSTRUCTIONS  You may brush your teeth and gargle the morning of surgery.  DO NOT SWALLOW WATER.   Take omeprazole morning of surgery with a sip of water.  Aspirin, Ibuprofen, Advil, Naproxen, Vitamin E and other Anti-inflammatory products and supplements should be stopped for 5 -7days before surgery or as directed by your physician.  Do not smoke or drink any alcoholic beverages 24 hours prior to surgery.  This includes NA Beer. Refrain from the usage of any recreational drugs, including non-prescribed prescription drugs.   You MUST plan for a responsible adult to stay on site while you are here and take you home after your surgery. You will not be allowed to leave alone or drive yourself home. It is strongly suggested someone stay with you the first 24 hrs. Your surgery will be cancelled if you do not have a ride home.  To help prevent infection, change your sheets the night before surgery.   If you  have a Living Will and Durable Power of  for Healthcare, please bring in a copy.  Notify your Surgeon if you develop any illness between now and time of surgery. Cough, cold, fever, sore throat, nausea, vomiting, etc.  Please notify your surgeon if you experience dizziness, shortness of breath or blurred vision

## 2024-10-09 NOTE — TELEPHONE ENCOUNTER
Scheduled for EGD and Colonoscopy with anesthesia for 10/11/2024 arrive at 9:00 AM and prep was emailed to patient.

## 2024-10-09 NOTE — PROGRESS NOTES
Sherif G Runner    Age 59 y.o.    male    1965    MRN [id number]    10/11/2024  Arrival Time_____________  OR Time____________90 Min     Procedure(s):  COLONOSCOPY  ESOPHAGOGASTRODUODENOSCOPY                      Monitor Anesthesia Care    Surgeon(s):  Sherif Horta, MD       Phone [phone] (home)     [facility]  Date  Info Source  Home  Cell         Work  _____________________________________________________________________  _____________________________________________________________________  _____________________________________________________________________  _____________________________________________________________________  _____________________________________________________________________    PCP _____________________________ Phone_________________     H&P  ________________  Bringing      Chart              Epic      DOS      Called________  EKG ________________   Bringing      Chart              Epic      DOS      Called________  LABS________________   Bringing     Chart              Epic      DOS      Called________  Cardiac Clearance ______ Bringing      Chart              Epic      DOS      Called________  Pulmonary Clearance____ Bringing      Chart              Epic      DOS      Called________    Cardiologist________________________ Phone___________________________  Pulmonologist_______________________Phone___________________________    ? Advance Directives   ? Restorationist concerns / Waiver on Chart            PAT Communications________________  ? Pre-op Instructions Given /Understood          _________________________________  ? Directions to Surgery Center                          _________________________________  ? Transportation Home_______________      __________________________________  ? Crutches/Walker__________________        __________________________________    Orders: Hard copy/ EPIC                 Transcribed/ EPIC              _______Wt.    ________Pharmacy

## 2024-10-10 ENCOUNTER — ANESTHESIA EVENT (OUTPATIENT)
Dept: ENDOSCOPY | Age: 59
End: 2024-10-10
Payer: COMMERCIAL

## 2024-10-11 ENCOUNTER — ANESTHESIA (OUTPATIENT)
Dept: ENDOSCOPY | Age: 59
End: 2024-10-11
Payer: COMMERCIAL

## 2024-10-11 ENCOUNTER — HOSPITAL ENCOUNTER (OUTPATIENT)
Age: 59
Setting detail: OUTPATIENT SURGERY
Discharge: HOME | End: 2024-10-11
Attending: INTERNAL MEDICINE | Admitting: INTERNAL MEDICINE
Payer: COMMERCIAL

## 2024-10-11 VITALS
RESPIRATION RATE: 18 BRPM | SYSTOLIC BLOOD PRESSURE: 137 MMHG | TEMPERATURE: 97.7 F | OXYGEN SATURATION: 100 % | BODY MASS INDEX: 27.48 KG/M2 | DIASTOLIC BLOOD PRESSURE: 74 MMHG | HEIGHT: 66 IN | WEIGHT: 171 LBS | HEART RATE: 50 BPM

## 2024-10-11 DIAGNOSIS — D50.9 MICROCYTIC ANEMIA: ICD-10-CM

## 2024-10-11 PROCEDURE — 3609017100 HC EGD: Performed by: INTERNAL MEDICINE

## 2024-10-11 PROCEDURE — 3700000000 HC ANESTHESIA ATTENDED CARE: Performed by: INTERNAL MEDICINE

## 2024-10-11 PROCEDURE — 3700000001 HC ADD 15 MINUTES (ANESTHESIA): Performed by: INTERNAL MEDICINE

## 2024-10-11 PROCEDURE — 7100000010 HC PHASE II RECOVERY - FIRST 15 MIN: Performed by: INTERNAL MEDICINE

## 2024-10-11 PROCEDURE — 6360000002 HC RX W HCPCS: Performed by: NURSE ANESTHETIST, CERTIFIED REGISTERED

## 2024-10-11 PROCEDURE — 3609012400 HC EGD TRANSORAL BIOPSY SINGLE/MULTIPLE: Performed by: INTERNAL MEDICINE

## 2024-10-11 PROCEDURE — 2500000003 HC RX 250 WO HCPCS: Performed by: NURSE ANESTHETIST, CERTIFIED REGISTERED

## 2024-10-11 PROCEDURE — 2709999900 HC NON-CHARGEABLE SUPPLY: Performed by: INTERNAL MEDICINE

## 2024-10-11 PROCEDURE — 3609010600 HC COLONOSCOPY POLYPECTOMY SNARE/COLD BIOPSY: Performed by: INTERNAL MEDICINE

## 2024-10-11 PROCEDURE — 2580000003 HC RX 258: Performed by: NURSE ANESTHETIST, CERTIFIED REGISTERED

## 2024-10-11 PROCEDURE — 7100000011 HC PHASE II RECOVERY - ADDTL 15 MIN: Performed by: INTERNAL MEDICINE

## 2024-10-11 PROCEDURE — 3609027000 HC COLONOSCOPY: Performed by: INTERNAL MEDICINE

## 2024-10-11 PROCEDURE — 88305 TISSUE EXAM BY PATHOLOGIST: CPT

## 2024-10-11 RX ORDER — DIPHENHYDRAMINE HYDROCHLORIDE 50 MG/ML
12.5 INJECTION INTRAMUSCULAR; INTRAVENOUS
Status: DISCONTINUED | OUTPATIENT
Start: 2024-10-11 | End: 2024-10-11 | Stop reason: HOSPADM

## 2024-10-11 RX ORDER — LIDOCAINE HYDROCHLORIDE 10 MG/ML
1 INJECTION, SOLUTION EPIDURAL; INFILTRATION; INTRACAUDAL; PERINEURAL
Status: DISCONTINUED | OUTPATIENT
Start: 2024-10-11 | End: 2024-10-11 | Stop reason: HOSPADM

## 2024-10-11 RX ORDER — SODIUM CHLORIDE, SODIUM LACTATE, POTASSIUM CHLORIDE, CALCIUM CHLORIDE 600; 310; 30; 20 MG/100ML; MG/100ML; MG/100ML; MG/100ML
INJECTION, SOLUTION INTRAVENOUS CONTINUOUS
Status: DISCONTINUED | OUTPATIENT
Start: 2024-10-11 | End: 2024-10-11 | Stop reason: HOSPADM

## 2024-10-11 RX ORDER — SODIUM CHLORIDE, SODIUM LACTATE, POTASSIUM CHLORIDE, CALCIUM CHLORIDE 600; 310; 30; 20 MG/100ML; MG/100ML; MG/100ML; MG/100ML
INJECTION, SOLUTION INTRAVENOUS
Status: DISCONTINUED | OUTPATIENT
Start: 2024-10-11 | End: 2024-10-11 | Stop reason: SDUPTHER

## 2024-10-11 RX ORDER — SODIUM CHLORIDE 0.9 % (FLUSH) 0.9 %
5-40 SYRINGE (ML) INJECTION PRN
Status: DISCONTINUED | OUTPATIENT
Start: 2024-10-11 | End: 2024-10-11 | Stop reason: HOSPADM

## 2024-10-11 RX ORDER — SODIUM CHLORIDE 9 MG/ML
INJECTION, SOLUTION INTRAVENOUS PRN
Status: DISCONTINUED | OUTPATIENT
Start: 2024-10-11 | End: 2024-10-11 | Stop reason: HOSPADM

## 2024-10-11 RX ORDER — SODIUM CHLORIDE 0.9 % (FLUSH) 0.9 %
5-40 SYRINGE (ML) INJECTION EVERY 12 HOURS SCHEDULED
Status: DISCONTINUED | OUTPATIENT
Start: 2024-10-11 | End: 2024-10-11 | Stop reason: HOSPADM

## 2024-10-11 RX ORDER — OXYCODONE HYDROCHLORIDE 5 MG/1
10 TABLET ORAL PRN
Status: DISCONTINUED | OUTPATIENT
Start: 2024-10-11 | End: 2024-10-11 | Stop reason: HOSPADM

## 2024-10-11 RX ORDER — OXYCODONE HYDROCHLORIDE 5 MG/1
5 TABLET ORAL PRN
Status: DISCONTINUED | OUTPATIENT
Start: 2024-10-11 | End: 2024-10-11 | Stop reason: HOSPADM

## 2024-10-11 RX ORDER — LABETALOL HYDROCHLORIDE 5 MG/ML
5 INJECTION, SOLUTION INTRAVENOUS EVERY 10 MIN PRN
Status: DISCONTINUED | OUTPATIENT
Start: 2024-10-11 | End: 2024-10-11 | Stop reason: HOSPADM

## 2024-10-11 RX ORDER — ONDANSETRON 2 MG/ML
4 INJECTION INTRAMUSCULAR; INTRAVENOUS
Status: DISCONTINUED | OUTPATIENT
Start: 2024-10-11 | End: 2024-10-11 | Stop reason: HOSPADM

## 2024-10-11 RX ORDER — NALOXONE HYDROCHLORIDE 0.4 MG/ML
INJECTION, SOLUTION INTRAMUSCULAR; INTRAVENOUS; SUBCUTANEOUS PRN
Status: DISCONTINUED | OUTPATIENT
Start: 2024-10-11 | End: 2024-10-11 | Stop reason: HOSPADM

## 2024-10-11 RX ORDER — MEPERIDINE HYDROCHLORIDE 50 MG/ML
12.5 INJECTION INTRAMUSCULAR; INTRAVENOUS; SUBCUTANEOUS EVERY 5 MIN PRN
Status: DISCONTINUED | OUTPATIENT
Start: 2024-10-11 | End: 2024-10-11 | Stop reason: HOSPADM

## 2024-10-11 RX ORDER — LIDOCAINE HYDROCHLORIDE 10 MG/ML
INJECTION, SOLUTION INFILTRATION; PERINEURAL
Status: DISCONTINUED | OUTPATIENT
Start: 2024-10-11 | End: 2024-10-11 | Stop reason: SDUPTHER

## 2024-10-11 RX ORDER — PROPOFOL 10 MG/ML
INJECTION, EMULSION INTRAVENOUS
Status: DISCONTINUED | OUTPATIENT
Start: 2024-10-11 | End: 2024-10-11 | Stop reason: SDUPTHER

## 2024-10-11 RX ADMIN — PROPOFOL 100 MG: 10 INJECTION, EMULSION INTRAVENOUS at 09:47

## 2024-10-11 RX ADMIN — LIDOCAINE HYDROCHLORIDE 50 MG: 10 INJECTION, SOLUTION INFILTRATION; PERINEURAL at 09:47

## 2024-10-11 RX ADMIN — PROPOFOL 1000 MG: 10 INJECTION, EMULSION INTRAVENOUS at 09:49

## 2024-10-11 RX ADMIN — SODIUM CHLORIDE, SODIUM LACTATE, POTASSIUM CHLORIDE, AND CALCIUM CHLORIDE: .6; .31; .03; .02 INJECTION, SOLUTION INTRAVENOUS at 09:43

## 2024-10-11 ASSESSMENT — PAIN - FUNCTIONAL ASSESSMENT
PAIN_FUNCTIONAL_ASSESSMENT: NONE - DENIES PAIN
PAIN_FUNCTIONAL_ASSESSMENT: 0-10

## 2024-10-11 NOTE — H&P
History and Physical / Pre-Sedation Assessment    Patient:  Robert G Runner   :   1965     Intended Procedure: Colonoscopy, Esophagogastroduodenoscopy     HPI: Patient presents for endoscopic evaluation without documented passage of blood in stool, with anemia, with iron deficiency. He takes PPI bowel obstruction 4 year history of significant heartburn.    I did  update the past medical, surgical, social and / or family history.     Prior to Admission medications    Medication Sig Start Date End Date Taking? Authorizing Provider   escitalopram (LEXAPRO) 10 MG tablet Take 1 tablet by mouth daily 10/8/24  Yes Alberto Payan MD   amLODIPine (NORVASC) 5 MG tablet TAKE ONE TABLET BY MOUTH DAILY 24  Yes Alexander Villegas MD   omeprazole (PRILOSEC) 40 MG delayed release capsule TAKE ONE CAPSULE BY MOUTH DAILY 6/10/24  Yes Anai Chen MD   hydroCHLOROthiazide (HYDRODIURIL) 25 MG tablet TAKE ONE TABLET BY MOUTH EVERY MORNING 24  Yes Alexander Villegas MD   atorvastatin (LIPITOR) 40 MG tablet TAKE ONE TABLET BY MOUTH DAILY 24  Yes Alexander Villegas MD   tadalafil (CIALIS) 5 MG tablet 1-2 po prn qd as directed. 23  Yes Alberto Payan MD   fluticasone (FLONASE) 50 MCG/ACT nasal spray PLACE TWO SPRAYS IN EACH NOSTRIL ONCE DAILY 18  Yes Marilyn Romero MD   aspirin 81 MG EC tablet Take 1 tablet by mouth daily 12/26/20 3/12/24  Corona Li MD      Allergies: No Known Allergies  Past Medical History:   Diagnosis Date    Allergic rhinitis     Cerebral artery occlusion with cerebral infarction (HCC)     Eczema 2023    Heartburn     Hypertension     MEET (iron deficiency anemia)     Prediabetes 2018     Past Surgical History:   Procedure Laterality Date    WISDOM TOOTH EXTRACTION       Social:   Social History     Tobacco Use    Smoking status: Never    Smokeless tobacco: Never   Substance Use Topics    Alcohol use: Yes     Comment: ocassionally     Family:    Family History   Problem Relation Age of Onset    Hypertension Father     Pancreatic Cancer Father 75    Allergies Mother     Asthma Neg Hx     Cancer Neg Hx     Diabetes Neg Hx     Emphysema Neg Hx     Heart Failure Neg Hx        ROS: Pertinent items are noted in HPI, Past Medical/Surgical History    Physical Exam:  Vital Signs: BP (!) 175/101   Pulse 71   Temp 97.7 °F (36.5 °C)   Resp 16   Ht 1.676 m (5' 6\")   Wt 77.6 kg (171 lb)   SpO2 100%   BMI 27.60 kg/m²    Airway: Mallampati: II (soft palate, uvula, fauces visible)  Pulmonary:Normal  Cardiac:Normal  Abdomen:Normal    Pre-Procedure Assessment / Plan:  ASA: Class 2 - A normal healthy patient with mild systemic disease  Level of Sedation Plan:per anesthesia  Post Procedure plan: Return to same level of care  I assessed the patient and find that the patient is in satisfactory condition to proceed with the planned procedure and sedation plan.    I have explained the risk, benefits, and alternatives to the procedure; the patient understands and agrees to proceed. The patient understands that he should not drive home today and reports that his wife will take him home after the procedure.  The patient relates that after the procedure I may discuss the findings and plans with his escort(s).      Sherif Horta MD  10/11/2024

## 2024-10-11 NOTE — OP NOTE
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    Complications: None    Description of Procedure:  Informed consent was obtained from the patient after explanation of the procedure including indications, description of the procedure,  benefits and possible risks and complications of the procedure, and alternatives. Questions were answered.  The patient's history was reviewed and a directed physical examination was performed prior to the procedure.    Patient was monitored throughout the procedure with pulse oximetry and periodic assessment of vital signs. Patient was sedated as noted above. The Nursing staff and I performed a time out.     With the patient in the left lateral decubitus position, the Olympus videoendoscope was placed in the patient's mouth and under direct visualization passed into the esophagus. The scope was ultimately passed to the second portion of the duodenum.  Visualization was performed during both introduction and withdrawal of the endoscope and retroflexed view of the proximal stomach was obtained.     Findings::   Esophagus: abnormal: Jeddo colored adenomatous appearing mucosa with tongues or islands of columnar mucosa, without squamous islands,  without nodules or masses,  without ulceration involves the distal

## 2024-10-11 NOTE — ANESTHESIA POSTPROCEDURE EVALUATION
CREATININE               1.4 (H)             12/24/2020 09:59 AM        GLUCOSE                  106 (H)             12/24/2020 09:59 AM   COAGS  Lab Results       Component                Value               Date/Time                  PROTIME                  11.9                12/24/2020 09:59 AM        INR                      1.03                12/24/2020 09:59 AM     Intake & Output:  @24HRIO@    Nausea & Vomiting:  No    Level of Consciousness:  Awake    Pain Assessment:  Adequate analgesia    Anesthesia Complications:  No apparent anesthetic complications    SUMMARY      Vital signs stable  OK to discharge from Stage I post anesthesia care.  Care transferred from Anesthesiology department on discharge from perioperative area     No notable events documented.

## 2024-10-11 NOTE — DISCHARGE INSTRUCTIONS
ENDOSCOPY:  Inspection of any cavity of the body by means of an endoscopy. An endoscope is a flexible tube that allows direct visualization of the cavity.    You have had a Colonoscopy and Esophagogastroduodenoscopy    Repeat Colonoscopy - 1 year    NO ASA/NSAIDS FOR ONE WEEK    Discharge Instructions    1)  You may experience some lightheadedness for the next several hours. We suggest you plan on quiet relation for the rest of the day.    2)  Because of the sedative drugs in your blood steam, be advised that you should not drive, operate any machinery, or sign contracts for the remainder of the day.    3)  You should not take any alcoholic beverages tonight, and only take sleeping medication that has been specifically prescribed for you by your physician.    4)  Unless instructed differently, resume your regular diet. Eat smaller portions for your first meal and progress to normal amounts over the rest of the day.    5)  If you have any fever, chills, excessive bleeding, uncontrolled pain, increased abdominal bloating, or any other problems, notify your doctor or return to the hospital.    6)  Do not expect a normal bowel movement for one to three days due to the cleansing of the large intestine prior to the colonoscopy.    7) If you have a polyp removed, do not do any strenuous activity and avoid the use of Aspirin or related compounds for 2 week.    8) If you have a sore throat, you may use lozenges, or salt water gargles.    9) Call for biopsy results in one week:  [phone]    10)  Follow high fiber diet instruction paper

## 2024-10-11 NOTE — PROGRESS NOTES
Wife to bedside updated with no questions. Discharge instructions reviewed with patient and responsible adult. Discharge instructions given with no additional questions. Patient to be discharged home with belongings.

## 2024-10-11 NOTE — ANESTHESIA PRE PROCEDURE
attack) G45.9   • Dyslipidemia E78.5   • Other hyperlipidemia E78.49   • Chronic insomnia F51.04   • Eczema L30.9       Past Medical History:        Diagnosis Date   • Allergic rhinitis    • Cerebral artery occlusion with cerebral infarction (HCC)    • Eczema 09/12/2023   • Heartburn    • Hypertension    • Prediabetes 04/25/2018       Past Surgical History:        Procedure Laterality Date   • WISDOM TOOTH EXTRACTION         Social History:    Social History     Tobacco Use   • Smoking status: Never   • Smokeless tobacco: Never   Substance Use Topics   • Alcohol use: Yes     Comment: ocassionally                                Counseling given: Not Answered      Vital Signs (Current):   Vitals:    10/09/24 1316 10/11/24 0915   BP:  (!) 175/101   Pulse:  71   Resp:  16   Temp:  97.7 °F (36.5 °C)   SpO2:  100%   Weight: 77.6 kg (171 lb)    Height: 1.676 m (5' 6\")                                               BP Readings from Last 3 Encounters:   10/11/24 (!) 175/101   10/08/24 (!) 150/82   03/12/24 (!) 142/80       NPO Status: Time of last liquid consumption: 0100                        Time of last solid consumption: 2000                        Date of last liquid consumption: 10/11/24                        Date of last solid food consumption: 10/09/24    BMI:   Wt Readings from Last 3 Encounters:   10/09/24 77.6 kg (171 lb)   10/08/24 79.8 kg (176 lb)   03/12/24 87.1 kg (192 lb)     Body mass index is 27.6 kg/m².    CBC:   Lab Results   Component Value Date/Time    WBC 10.6 12/25/2020 08:51 AM    RBC 5.54 12/25/2020 08:51 AM    HGB 14.5 12/25/2020 08:51 AM    HCT 43.1 12/25/2020 08:51 AM    MCV 77.8 12/25/2020 08:51 AM    RDW 14.8 12/25/2020 08:51 AM     12/25/2020 08:51 AM       CMP:   Lab Results   Component Value Date/Time     12/24/2020 09:59 AM    K 4.0 12/24/2020 09:59 AM     12/24/2020 09:59 AM    CO2 25 12/24/2020 09:59 AM    BUN 15 12/24/2020 09:59 AM    CREATININE 1.4 12/24/2020 09:59

## 2024-11-05 ENCOUNTER — OFFICE VISIT (OUTPATIENT)
Dept: INTERNAL MEDICINE CLINIC | Age: 59
End: 2024-11-05

## 2024-11-05 VITALS
WEIGHT: 170 LBS | BODY MASS INDEX: 29.02 KG/M2 | HEIGHT: 64 IN | HEART RATE: 58 BPM | DIASTOLIC BLOOD PRESSURE: 89 MMHG | SYSTOLIC BLOOD PRESSURE: 163 MMHG | OXYGEN SATURATION: 100 %

## 2024-11-05 DIAGNOSIS — D12.6 COLON ADENOMAS: ICD-10-CM

## 2024-11-05 DIAGNOSIS — D64.9 ANEMIA, UNSPECIFIED TYPE: ICD-10-CM

## 2024-11-05 DIAGNOSIS — F41.8 DEPRESSION WITH ANXIETY: Primary | ICD-10-CM

## 2024-11-05 DIAGNOSIS — R63.4 WEIGHT LOSS: ICD-10-CM

## 2024-11-05 PROCEDURE — 3077F SYST BP >= 140 MM HG: CPT | Performed by: FAMILY MEDICINE

## 2024-11-05 PROCEDURE — 3079F DIAST BP 80-89 MM HG: CPT | Performed by: FAMILY MEDICINE

## 2024-11-05 PROCEDURE — 99214 OFFICE O/P EST MOD 30 MIN: CPT | Performed by: FAMILY MEDICINE

## 2024-11-05 RX ORDER — ESCITALOPRAM OXALATE 20 MG/1
20 TABLET ORAL DAILY
Qty: 30 TABLET | Refills: 5 | Status: SHIPPED | OUTPATIENT
Start: 2024-11-05

## 2024-11-05 ASSESSMENT — PATIENT HEALTH QUESTIONNAIRE - PHQ9
8. MOVING OR SPEAKING SO SLOWLY THAT OTHER PEOPLE COULD HAVE NOTICED. OR THE OPPOSITE, BEING SO FIGETY OR RESTLESS THAT YOU HAVE BEEN MOVING AROUND A LOT MORE THAN USUAL: NOT AT ALL
4. FEELING TIRED OR HAVING LITTLE ENERGY: MORE THAN HALF THE DAYS
SUM OF ALL RESPONSES TO PHQ QUESTIONS 1-9: 10
5. POOR APPETITE OR OVEREATING: MORE THAN HALF THE DAYS
2. FEELING DOWN, DEPRESSED OR HOPELESS: MORE THAN HALF THE DAYS
3. TROUBLE FALLING OR STAYING ASLEEP: NOT AT ALL
6. FEELING BAD ABOUT YOURSELF - OR THAT YOU ARE A FAILURE OR HAVE LET YOURSELF OR YOUR FAMILY DOWN: MORE THAN HALF THE DAYS
SUM OF ALL RESPONSES TO PHQ QUESTIONS 1-9: 10
10. IF YOU CHECKED OFF ANY PROBLEMS, HOW DIFFICULT HAVE THESE PROBLEMS MADE IT FOR YOU TO DO YOUR WORK, TAKE CARE OF THINGS AT HOME, OR GET ALONG WITH OTHER PEOPLE: SOMEWHAT DIFFICULT
9. THOUGHTS THAT YOU WOULD BE BETTER OFF DEAD, OR OF HURTING YOURSELF: NOT AT ALL
7. TROUBLE CONCENTRATING ON THINGS, SUCH AS READING THE NEWSPAPER OR WATCHING TELEVISION: MORE THAN HALF THE DAYS

## 2024-11-05 NOTE — PROGRESS NOTES
Chief Complaint   Patient presents with    Anxiety     States the lexapro started to help. Over the last week he feels like he is going backwards, having some sexual side effects.     Other     Had his colonoscopy. Polyps were removed     Anorexia     Eating once a day, sometimes twice.      Continues with difficulty with mood, anhedonia, anxiety- felt like lexapro was starting to help \"then regressed\" per pt last 1-2 wks.   Feeling onset of sx's like about to get panic attack but not yet full-blown  Poor mood, poor appetite, anxiety, denies SI  Sexual side effects with lexapro- had similar SEs years ago with wellbutrin.    OCD sx's currently stable, fairly controlled  Had EGD, colonoscopy- see results.  Multiple polyps, advised repeat C-scope 1 yr        BP (!) 163/89 (Site: Left Upper Arm, Position: Sitting)   Pulse 58   Ht 1.626 m (5' 4\")   Wt 77.1 kg (170 lb)   SpO2 100%   BMI 29.18 kg/m²   Repeat 140/78    A+Ox4, NAD, WD/WN, flat affect  Conj clear, no icterus  OP clear  Neck supple, no LAD  Lungs CTA B  CV: RRR, no M/R/G, nl S1S  Abd: soft, NT/ND  Extr: No edema  Skin: warm dry, no rash no obvious lesions    Assessment/plan:     Sherif was seen today for anxiety, other and anorexia.    Diagnoses and all orders for this visit:    Depression with anxiety  Incr to 20mg qd lexapro; we decided sexual SEs risk < benefits of treating above.    Colon adenomas  Repeat 1 yr, likely contributory to anemia    Anemia, unspecified type  Iron 325mg qd    Weight loss  RTO 4 wks.  Mood disorder very likely contributory but if continues, consider further workup  Historical baseline weight around 190    Other orders  -     escitalopram (LEXAPRO) 20 MG tablet; Take 1 tablet by mouth daily

## 2024-11-21 RX ORDER — ATORVASTATIN CALCIUM 40 MG/1
TABLET, FILM COATED ORAL
Qty: 30 TABLET | Refills: 5 | Status: SHIPPED | OUTPATIENT
Start: 2024-11-21

## 2024-11-21 RX ORDER — OMEPRAZOLE 40 MG/1
CAPSULE, DELAYED RELEASE ORAL
Qty: 30 CAPSULE | Refills: 5 | Status: SHIPPED | OUTPATIENT
Start: 2024-11-21

## 2024-11-21 RX ORDER — HYDROCHLOROTHIAZIDE 25 MG/1
TABLET ORAL
Qty: 30 TABLET | Refills: 5 | Status: SHIPPED | OUTPATIENT
Start: 2024-11-21

## 2024-11-21 NOTE — TELEPHONE ENCOUNTER
Requested Prescriptions     Pending Prescriptions Disp Refills    hydroCHLOROthiazide (HYDRODIURIL) 25 MG tablet 30 tablet 5     Sig: TAKE ONE TABLET BY MOUTH EVERY MORNING    omeprazole (PRILOSEC) 40 MG delayed release capsule 30 capsule 5     Sig: TAKE ONE CAPSULE BY MOUTH DAILY    atorvastatin (LIPITOR) 40 MG tablet 30 tablet 5     Sig: TAKE ONE TABLET BY MOUTH DAILY

## 2025-01-28 ENCOUNTER — OFFICE VISIT (OUTPATIENT)
Dept: INTERNAL MEDICINE CLINIC | Age: 60
End: 2025-01-28

## 2025-01-28 VITALS
OXYGEN SATURATION: 99 % | HEIGHT: 64 IN | SYSTOLIC BLOOD PRESSURE: 164 MMHG | BODY MASS INDEX: 29.02 KG/M2 | DIASTOLIC BLOOD PRESSURE: 85 MMHG | HEART RATE: 61 BPM | WEIGHT: 170 LBS

## 2025-01-28 DIAGNOSIS — E78.5 HYPERLIPIDEMIA, UNSPECIFIED HYPERLIPIDEMIA TYPE: ICD-10-CM

## 2025-01-28 DIAGNOSIS — Z11.4 ENCOUNTER FOR SCREENING FOR HIV: ICD-10-CM

## 2025-01-28 DIAGNOSIS — F32.1 CURRENT MODERATE EPISODE OF MAJOR DEPRESSIVE DISORDER WITHOUT PRIOR EPISODE (HCC): Primary | ICD-10-CM

## 2025-01-28 DIAGNOSIS — I10 HYPERTENSION, UNSPECIFIED TYPE: ICD-10-CM

## 2025-01-28 DIAGNOSIS — Z11.59 NEED FOR HEPATITIS C SCREENING TEST: ICD-10-CM

## 2025-01-28 DIAGNOSIS — F42.2 MIXED OBSESSIONAL THOUGHTS AND ACTS: ICD-10-CM

## 2025-01-28 LAB
CHOLEST SERPL-MCNC: 179 MG/DL (ref 0–199)
EST. AVERAGE GLUCOSE BLD GHB EST-MCNC: 93.9 MG/DL
HBA1C MFR BLD: 4.9 %
HCV AB SERPL QL IA: NORMAL
HDLC SERPL-MCNC: 69 MG/DL (ref 40–60)
LDLC SERPL CALC-MCNC: 96 MG/DL
TRIGL SERPL-MCNC: 71 MG/DL (ref 0–150)
VLDLC SERPL CALC-MCNC: 14 MG/DL

## 2025-01-28 PROCEDURE — 3077F SYST BP >= 140 MM HG: CPT | Performed by: FAMILY MEDICINE

## 2025-01-28 PROCEDURE — 3079F DIAST BP 80-89 MM HG: CPT | Performed by: FAMILY MEDICINE

## 2025-01-28 PROCEDURE — 99214 OFFICE O/P EST MOD 30 MIN: CPT | Performed by: FAMILY MEDICINE

## 2025-01-28 ASSESSMENT — PATIENT HEALTH QUESTIONNAIRE - PHQ9
1. LITTLE INTEREST OR PLEASURE IN DOING THINGS: NOT AT ALL
4. FEELING TIRED OR HAVING LITTLE ENERGY: NOT AT ALL
8. MOVING OR SPEAKING SO SLOWLY THAT OTHER PEOPLE COULD HAVE NOTICED. OR THE OPPOSITE, BEING SO FIGETY OR RESTLESS THAT YOU HAVE BEEN MOVING AROUND A LOT MORE THAN USUAL: NOT AT ALL
SUM OF ALL RESPONSES TO PHQ QUESTIONS 1-9: 0
SUM OF ALL RESPONSES TO PHQ QUESTIONS 1-9: 0
2. FEELING DOWN, DEPRESSED OR HOPELESS: NOT AT ALL
SUM OF ALL RESPONSES TO PHQ QUESTIONS 1-9: 0
10. IF YOU CHECKED OFF ANY PROBLEMS, HOW DIFFICULT HAVE THESE PROBLEMS MADE IT FOR YOU TO DO YOUR WORK, TAKE CARE OF THINGS AT HOME, OR GET ALONG WITH OTHER PEOPLE: NOT DIFFICULT AT ALL
SUM OF ALL RESPONSES TO PHQ9 QUESTIONS 1 & 2: 0
7. TROUBLE CONCENTRATING ON THINGS, SUCH AS READING THE NEWSPAPER OR WATCHING TELEVISION: NOT AT ALL
SUM OF ALL RESPONSES TO PHQ QUESTIONS 1-9: 0
9. THOUGHTS THAT YOU WOULD BE BETTER OFF DEAD, OR OF HURTING YOURSELF: NOT AT ALL
3. TROUBLE FALLING OR STAYING ASLEEP: NOT AT ALL
6. FEELING BAD ABOUT YOURSELF - OR THAT YOU ARE A FAILURE OR HAVE LET YOURSELF OR YOUR FAMILY DOWN: NOT AT ALL
5. POOR APPETITE OR OVEREATING: NOT AT ALL

## 2025-01-29 LAB
HIV 1+2 AB+HIV1 P24 AG SERPL QL IA: NORMAL
HIV 2 AB SERPL QL IA: NORMAL
HIV1 AB SERPL QL IA: NORMAL
HIV1 P24 AG SERPL QL IA: NORMAL

## 2025-02-11 PROBLEM — F32.1 CURRENT MODERATE EPISODE OF MAJOR DEPRESSIVE DISORDER WITHOUT PRIOR EPISODE (HCC): Status: ACTIVE | Noted: 2025-02-11

## 2025-02-11 RX ORDER — ESCITALOPRAM OXALATE 20 MG/1
20 TABLET ORAL 2 TIMES DAILY
Qty: 30 TABLET | Refills: 5 | Status: SHIPPED | OUTPATIENT
Start: 2025-02-11

## 2025-02-11 NOTE — PROGRESS NOTES
Chief Complaint   Patient presents with    Depression     States he is on the 20 mg and for the first few days he was feeling some vertigo. That is now gone and he does not feel much of a difference.     Anxiety     Follow up from 11/05/2024    Health Maintenance     Labs pending     Other     Has insurance send to PCP       Still ongoing sx's of anxiety and depressed mood, not much different than prior.  Poor mood, energy, anhedonia.  Incr dose of lexapro not helping.  +stress.  No SI.    BP elevated today in clinic but pt states it is situational and betterr controlled at home, and is able to control and lower it with slow deep breathing and relaxation techniques when it situationally goes up.      Past Medical History:   Diagnosis Date    Allergic rhinitis     Cerebral artery occlusion with cerebral infarction (HCC)     Eczema 09/12/2023    Heartburn     Hypertension     MEET (iron deficiency anemia) 2020    Prediabetes 04/25/2018     Patient Active Problem List   Diagnosis    Sleep apnea    OCD (obsessive compulsive disorder)    Hypertension    Seasonal allergic rhinitis    BMI 30.0-30.9,adult    Prediabetes    Hypertensive urgency    Vision, loss, sudden, left    GERD (gastroesophageal reflux disease)    Hypertensive emergency    Vision loss    Transient monocular blindness, left    TIA (transient ischemic attack)    Dyslipidemia    Other hyperlipidemia    Chronic insomnia    Eczema    Current moderate episode of major depressive disorder without prior episode (HCC)     Current Outpatient Medications on File Prior to Visit   Medication Sig Dispense Refill    hydroCHLOROthiazide (HYDRODIURIL) 25 MG tablet TAKE ONE TABLET BY MOUTH EVERY MORNING 30 tablet 5    omeprazole (PRILOSEC) 40 MG delayed release capsule TAKE ONE CAPSULE BY MOUTH DAILY 30 capsule 5    atorvastatin (LIPITOR) 40 MG tablet TAKE ONE TABLET BY MOUTH DAILY 30 tablet 5    amLODIPine (NORVASC) 5 MG tablet TAKE ONE TABLET BY MOUTH DAILY 30 tablet 5

## 2025-04-03 RX ORDER — AMLODIPINE BESYLATE 5 MG/1
TABLET ORAL
Qty: 30 TABLET | Refills: 5 | Status: SHIPPED | OUTPATIENT
Start: 2025-04-03

## 2025-04-03 NOTE — TELEPHONE ENCOUNTER
Requested Prescriptions     Pending Prescriptions Disp Refills    amLODIPine (NORVASC) 5 MG tablet 30 tablet 5     Sig: TAKE ONE TABLET BY MOUTH DAILY    Follow up scheduled 07/29/2025

## 2025-06-02 RX ORDER — ATORVASTATIN CALCIUM 40 MG/1
TABLET, FILM COATED ORAL
Qty: 30 TABLET | Refills: 5 | Status: SHIPPED | OUTPATIENT
Start: 2025-06-02

## 2025-06-02 RX ORDER — OMEPRAZOLE 40 MG/1
CAPSULE, DELAYED RELEASE ORAL
Qty: 30 CAPSULE | Refills: 5 | Status: SHIPPED | OUTPATIENT
Start: 2025-06-02

## 2025-06-02 NOTE — TELEPHONE ENCOUNTER
Requested Prescriptions     Pending Prescriptions Disp Refills    atorvastatin (LIPITOR) 40 MG tablet 30 tablet 5     Sig: TAKE ONE TABLET BY MOUTH DAILY    omeprazole (PRILOSEC) 40 MG delayed release capsule 30 capsule 5     Sig: TAKE ONE CAPSULE BY MOUTH DAILY      Follow up scheduled 07/29/2025

## 2025-06-04 RX ORDER — HYDROCHLOROTHIAZIDE 25 MG/1
TABLET ORAL
Qty: 30 TABLET | Refills: 5 | Status: SHIPPED | OUTPATIENT
Start: 2025-06-04

## 2025-06-04 NOTE — TELEPHONE ENCOUNTER
Requested Prescriptions     Pending Prescriptions Disp Refills    hydroCHLOROthiazide (HYDRODIURIL) 25 MG tablet 30 tablet 5     Sig: TAKE ONE TABLET BY MOUTH EVERY MORNING    Follow up scheduled for 07/29/2025

## (undated) DEVICE — ELECTRODE ECG MONITR FOAM TEAR DROP ADLT RED

## (undated) DEVICE — SNARE ENDOSCP L240CM SHTH DIA24MM LOOP W10MM POLYP RND REINF

## (undated) DEVICE — TRAP SPEC POLYPR SGL CHMBR FN MESH SCRN

## (undated) DEVICE — CONMED SCOPE SAVER BITE BLOCK, 20X27 MM: Brand: SCOPE SAVER

## (undated) DEVICE — FORCEPS BX L240CM JAW DIA2.8MM L CAP W/ NDL MIC MESH TOOTH

## (undated) DEVICE — ENDOSCOPIC KIT 2 12 FT OP4 DE2 GWN SYR

## (undated) DEVICE — CANNULA NSL AD L7FT CLR VYN CRV PRNG NONFLARED TIP STD CONN

## (undated) DEVICE — SNARE ENDOSCP L240CM W15MM SHTH DIA2.4MM CHN 2.8MM STIFF